# Patient Record
Sex: FEMALE | Race: WHITE | Employment: FULL TIME | URBAN - METROPOLITAN AREA
[De-identification: names, ages, dates, MRNs, and addresses within clinical notes are randomized per-mention and may not be internally consistent; named-entity substitution may affect disease eponyms.]

---

## 2019-02-01 PROBLEM — R01.1 MURMUR, CARDIAC: Chronic | Status: ACTIVE | Noted: 2019-02-01

## 2019-02-01 PROBLEM — R06.09 DYSPNEA ON EXERTION: Chronic | Status: ACTIVE | Noted: 2019-02-01

## 2019-09-24 RX ORDER — ASPIRIN 81 MG/1
81 TABLET ORAL DAILY
COMMUNITY

## 2019-09-24 NOTE — PROGRESS NOTES
Patient pre-assessment complete for [facility] poss with Dr Briseida Kay scheduled for 19 at 9:30am, arrival time 7:30am. Patient verified using . Patient instructed to bring all home medications in labeled bottles on the day of procedure. NPO status reinforced. Patient informed to take a full dose aspirin 325mg  or 81 mg x 4 on the day of procedure. . Instructed they can take all other medications excluding vitamins & supplements. Patient verbalizes understanding of all instructions & denies any questions at this time.

## 2019-09-25 ENCOUNTER — HOSPITAL ENCOUNTER (OUTPATIENT)
Dept: CARDIAC CATH/INVASIVE PROCEDURES | Age: 69
Setting detail: OBSERVATION
Discharge: HOME | End: 2019-09-26
Attending: INTERNAL MEDICINE | Admitting: INTERNAL MEDICINE
Payer: MEDICARE

## 2019-09-25 DIAGNOSIS — I25.10 CAD S/P PERCUTANEOUS CORONARY ANGIOPLASTY: Primary | ICD-10-CM

## 2019-09-25 DIAGNOSIS — Z98.61 CAD S/P PERCUTANEOUS CORONARY ANGIOPLASTY: Primary | ICD-10-CM

## 2019-09-25 PROBLEM — R07.2 PRECORDIAL PAIN: Chronic | Status: ACTIVE | Noted: 2019-09-25

## 2019-09-25 PROBLEM — I25.118 CORONARY ARTERY DISEASE OF NATIVE ARTERY OF NATIVE HEART WITH STABLE ANGINA PECTORIS (HCC): Chronic | Status: ACTIVE | Noted: 2019-09-25

## 2019-09-25 PROBLEM — E78.5 DYSLIPIDEMIA: Chronic | Status: ACTIVE | Noted: 2019-09-25

## 2019-09-25 LAB
ANION GAP SERPL CALC-SCNC: 3 MMOL/L (ref 7–16)
ATRIAL RATE: 53 BPM
ATRIAL RATE: 56 BPM
BUN SERPL-MCNC: 10 MG/DL (ref 8–23)
CALCIUM SERPL-MCNC: 9.2 MG/DL (ref 8.3–10.4)
CALCULATED P AXIS, ECG09: 35 DEGREES
CALCULATED P AXIS, ECG09: 66 DEGREES
CALCULATED R AXIS, ECG10: 30 DEGREES
CALCULATED R AXIS, ECG10: 47 DEGREES
CALCULATED T AXIS, ECG11: 30 DEGREES
CALCULATED T AXIS, ECG11: 48 DEGREES
CHLORIDE SERPL-SCNC: 108 MMOL/L (ref 98–107)
CO2 SERPL-SCNC: 29 MMOL/L (ref 21–32)
CREAT SERPL-MCNC: 0.72 MG/DL (ref 0.6–1)
DIAGNOSIS, 93000: NORMAL
DIAGNOSIS, 93000: NORMAL
ERYTHROCYTE [DISTWIDTH] IN BLOOD BY AUTOMATED COUNT: 13.4 % (ref 11.9–14.6)
GLUCOSE SERPL-MCNC: 93 MG/DL (ref 65–100)
HCT VFR BLD AUTO: 38.5 % (ref 35.8–46.3)
HGB BLD-MCNC: 12.6 G/DL (ref 11.7–15.4)
INR PPP: 0.9
MAGNESIUM SERPL-MCNC: 2.4 MG/DL (ref 1.8–2.4)
MCH RBC QN AUTO: 32 PG (ref 26.1–32.9)
MCHC RBC AUTO-ENTMCNC: 32.7 G/DL (ref 31.4–35)
MCV RBC AUTO: 97.7 FL (ref 79.6–97.8)
NRBC # BLD: 0 K/UL (ref 0–0.2)
P-R INTERVAL, ECG05: 164 MS
P-R INTERVAL, ECG05: 200 MS
PLATELET # BLD AUTO: 209 K/UL (ref 150–450)
PMV BLD AUTO: 10.7 FL (ref 9.4–12.3)
POTASSIUM SERPL-SCNC: 4.8 MMOL/L (ref 3.5–5.1)
PROTHROMBIN TIME: 12.2 SEC (ref 11.7–14.5)
Q-T INTERVAL, ECG07: 438 MS
Q-T INTERVAL, ECG07: 454 MS
QRS DURATION, ECG06: 88 MS
QRS DURATION, ECG06: 88 MS
QTC CALCULATION (BEZET), ECG08: 410 MS
QTC CALCULATION (BEZET), ECG08: 438 MS
RBC # BLD AUTO: 3.94 M/UL (ref 4.05–5.2)
SODIUM SERPL-SCNC: 140 MMOL/L (ref 136–145)
VENTRICULAR RATE, ECG03: 53 BPM
VENTRICULAR RATE, ECG03: 56 BPM
WBC # BLD AUTO: 5.5 K/UL (ref 4.3–11.1)

## 2019-09-25 PROCEDURE — 92928 PRQ TCAT PLMT NTRAC ST 1 LES: CPT

## 2019-09-25 PROCEDURE — 74011250637 HC RX REV CODE- 250/637: Performed by: NURSE PRACTITIONER

## 2019-09-25 PROCEDURE — C1874 STENT, COATED/COV W/DEL SYS: HCPCS

## 2019-09-25 PROCEDURE — 77030029997 HC DEV COM RDL R BND TELE -B

## 2019-09-25 PROCEDURE — C1769 GUIDE WIRE: HCPCS

## 2019-09-25 PROCEDURE — 74011636320 HC RX REV CODE- 636/320: Performed by: INTERNAL MEDICINE

## 2019-09-25 PROCEDURE — 74011250636 HC RX REV CODE- 250/636

## 2019-09-25 PROCEDURE — 77030004534 HC CATH ANGI DX INFN CARD -A

## 2019-09-25 PROCEDURE — C1725 CATH, TRANSLUMIN NON-LASER: HCPCS

## 2019-09-25 PROCEDURE — 93458 L HRT ARTERY/VENTRICLE ANGIO: CPT

## 2019-09-25 PROCEDURE — 77030012468 HC VLV BLEEDBK CNTRL ABBT -B

## 2019-09-25 PROCEDURE — 83735 ASSAY OF MAGNESIUM: CPT

## 2019-09-25 PROCEDURE — 99218 HC RM OBSERVATION: CPT

## 2019-09-25 PROCEDURE — 80048 BASIC METABOLIC PNL TOTAL CA: CPT

## 2019-09-25 PROCEDURE — 74011250637 HC RX REV CODE- 250/637: Performed by: INTERNAL MEDICINE

## 2019-09-25 PROCEDURE — 99153 MOD SED SAME PHYS/QHP EA: CPT

## 2019-09-25 PROCEDURE — 85610 PROTHROMBIN TIME: CPT

## 2019-09-25 PROCEDURE — C1894 INTRO/SHEATH, NON-LASER: HCPCS

## 2019-09-25 PROCEDURE — 77030015766

## 2019-09-25 PROCEDURE — 74011000250 HC RX REV CODE- 250: Performed by: INTERNAL MEDICINE

## 2019-09-25 PROCEDURE — 85027 COMPLETE CBC AUTOMATED: CPT

## 2019-09-25 PROCEDURE — 74011000258 HC RX REV CODE- 258: Performed by: INTERNAL MEDICINE

## 2019-09-25 PROCEDURE — 93005 ELECTROCARDIOGRAM TRACING: CPT | Performed by: INTERNAL MEDICINE

## 2019-09-25 PROCEDURE — C1887 CATHETER, GUIDING: HCPCS

## 2019-09-25 PROCEDURE — 74011250636 HC RX REV CODE- 250/636: Performed by: INTERNAL MEDICINE

## 2019-09-25 PROCEDURE — 99152 MOD SED SAME PHYS/QHP 5/>YRS: CPT

## 2019-09-25 RX ORDER — DIPHENHYDRAMINE HCL 25 MG
25 CAPSULE ORAL
Status: DISCONTINUED | OUTPATIENT
Start: 2019-09-25 | End: 2019-09-26 | Stop reason: HOSPADM

## 2019-09-25 RX ORDER — ONDANSETRON 2 MG/ML
4 INJECTION INTRAMUSCULAR; INTRAVENOUS
Status: DISCONTINUED | OUTPATIENT
Start: 2019-09-25 | End: 2019-09-26 | Stop reason: HOSPADM

## 2019-09-25 RX ORDER — ATORVASTATIN CALCIUM 40 MG/1
80 TABLET, FILM COATED ORAL
Status: DISCONTINUED | OUTPATIENT
Start: 2019-09-25 | End: 2019-09-26 | Stop reason: HOSPADM

## 2019-09-25 RX ORDER — NITROGLYCERIN 0.4 MG/1
0.4 TABLET SUBLINGUAL
Status: DISCONTINUED | OUTPATIENT
Start: 2019-09-25 | End: 2019-09-26 | Stop reason: HOSPADM

## 2019-09-25 RX ORDER — LISINOPRIL 5 MG/1
2.5 TABLET ORAL DAILY
Status: DISCONTINUED | OUTPATIENT
Start: 2019-09-26 | End: 2019-09-26 | Stop reason: HOSPADM

## 2019-09-25 RX ORDER — ASPIRIN 81 MG/1
81 TABLET ORAL DAILY
Status: DISCONTINUED | OUTPATIENT
Start: 2019-09-26 | End: 2019-09-25 | Stop reason: SDUPTHER

## 2019-09-25 RX ORDER — DIAZEPAM 5 MG/1
5 TABLET ORAL AS NEEDED
Status: DISCONTINUED | OUTPATIENT
Start: 2019-09-25 | End: 2019-09-25 | Stop reason: HOSPADM

## 2019-09-25 RX ORDER — LORAZEPAM 1 MG/1
1 TABLET ORAL
Status: DISCONTINUED | OUTPATIENT
Start: 2019-09-25 | End: 2019-09-26 | Stop reason: HOSPADM

## 2019-09-25 RX ORDER — HYDROCODONE BITARTRATE AND ACETAMINOPHEN 5; 325 MG/1; MG/1
1 TABLET ORAL
Status: DISCONTINUED | OUTPATIENT
Start: 2019-09-25 | End: 2019-09-26 | Stop reason: HOSPADM

## 2019-09-25 RX ORDER — SODIUM CHLORIDE 9 MG/ML
75 INJECTION, SOLUTION INTRAVENOUS CONTINUOUS
Status: DISCONTINUED | OUTPATIENT
Start: 2019-09-25 | End: 2019-09-26 | Stop reason: HOSPADM

## 2019-09-25 RX ORDER — SODIUM CHLORIDE 0.9 % (FLUSH) 0.9 %
5-40 SYRINGE (ML) INJECTION AS NEEDED
Status: DISCONTINUED | OUTPATIENT
Start: 2019-09-25 | End: 2019-09-26 | Stop reason: HOSPADM

## 2019-09-25 RX ORDER — HEPARIN SODIUM 200 [USP'U]/100ML
3 INJECTION, SOLUTION INTRAVENOUS CONTINUOUS
Status: DISCONTINUED | OUTPATIENT
Start: 2019-09-25 | End: 2019-09-25 | Stop reason: HOSPADM

## 2019-09-25 RX ORDER — ACETAMINOPHEN 325 MG/1
650 TABLET ORAL
Status: DISCONTINUED | OUTPATIENT
Start: 2019-09-25 | End: 2019-09-26 | Stop reason: HOSPADM

## 2019-09-25 RX ORDER — SODIUM CHLORIDE 0.9 % (FLUSH) 0.9 %
5 SYRINGE (ML) INJECTION AS NEEDED
Status: DISCONTINUED | OUTPATIENT
Start: 2019-09-25 | End: 2019-09-26 | Stop reason: HOSPADM

## 2019-09-25 RX ORDER — FENTANYL CITRATE 50 UG/ML
25-50 INJECTION, SOLUTION INTRAMUSCULAR; INTRAVENOUS
Status: DISCONTINUED | OUTPATIENT
Start: 2019-09-25 | End: 2019-09-25 | Stop reason: HOSPADM

## 2019-09-25 RX ORDER — GUAIFENESIN 100 MG/5ML
324 LIQUID (ML) ORAL ONCE
Status: ACTIVE | OUTPATIENT
Start: 2019-09-25 | End: 2019-09-25

## 2019-09-25 RX ORDER — GUAIFENESIN 100 MG/5ML
81 LIQUID (ML) ORAL DAILY
Status: DISCONTINUED | OUTPATIENT
Start: 2019-09-26 | End: 2019-09-26 | Stop reason: HOSPADM

## 2019-09-25 RX ORDER — LIDOCAINE HYDROCHLORIDE 10 MG/ML
6 INJECTION INFILTRATION; PERINEURAL ONCE
Status: COMPLETED | OUTPATIENT
Start: 2019-09-25 | End: 2019-09-25

## 2019-09-25 RX ORDER — SODIUM CHLORIDE 0.9 % (FLUSH) 0.9 %
5-40 SYRINGE (ML) INJECTION EVERY 8 HOURS
Status: DISCONTINUED | OUTPATIENT
Start: 2019-09-25 | End: 2019-09-26 | Stop reason: HOSPADM

## 2019-09-25 RX ORDER — MIDAZOLAM HYDROCHLORIDE 1 MG/ML
.5-2 INJECTION, SOLUTION INTRAMUSCULAR; INTRAVENOUS
Status: DISCONTINUED | OUTPATIENT
Start: 2019-09-25 | End: 2019-09-25 | Stop reason: HOSPADM

## 2019-09-25 RX ADMIN — MIDAZOLAM HYDROCHLORIDE 2 MG: 1 INJECTION, SOLUTION INTRAMUSCULAR; INTRAVENOUS at 12:10

## 2019-09-25 RX ADMIN — HEPARIN SODIUM 3 ML/HR: 200 INJECTION, SOLUTION INTRAVENOUS at 12:10

## 2019-09-25 RX ADMIN — FENTANYL CITRATE 50 MCG: 50 INJECTION, SOLUTION INTRAMUSCULAR; INTRAVENOUS at 12:10

## 2019-09-25 RX ADMIN — SODIUM CHLORIDE 75 ML/HR: 900 INJECTION, SOLUTION INTRAVENOUS at 08:19

## 2019-09-25 RX ADMIN — HEPARIN SODIUM 2 ML: 10000 INJECTION INTRAVENOUS; SUBCUTANEOUS at 12:16

## 2019-09-25 RX ADMIN — FENTANYL CITRATE 50 MCG: 50 INJECTION, SOLUTION INTRAMUSCULAR; INTRAVENOUS at 12:32

## 2019-09-25 RX ADMIN — IOPAMIDOL 200 ML: 755 INJECTION, SOLUTION INTRAVENOUS at 12:57

## 2019-09-25 RX ADMIN — TICAGRELOR 180 MG: 90 TABLET ORAL at 12:58

## 2019-09-25 RX ADMIN — LIDOCAINE HYDROCHLORIDE 3 ML: 10 INJECTION, SOLUTION INFILTRATION; PERINEURAL at 12:15

## 2019-09-25 RX ADMIN — DIPHENHYDRAMINE HYDROCHLORIDE 25 MG: 25 CAPSULE ORAL at 22:43

## 2019-09-25 RX ADMIN — HYDROCODONE BITARTRATE AND ACETAMINOPHEN 1 TABLET: 5; 325 TABLET ORAL at 13:42

## 2019-09-25 RX ADMIN — Medication 5 ML: at 22:20

## 2019-09-25 RX ADMIN — BIVALIRUDIN 1.75 MG/KG/HR: 250 INJECTION, POWDER, LYOPHILIZED, FOR SOLUTION INTRAVENOUS at 12:24

## 2019-09-25 RX ADMIN — HYDROCODONE BITARTRATE AND ACETAMINOPHEN 1 TABLET: 5; 325 TABLET ORAL at 17:35

## 2019-09-25 RX ADMIN — MIDAZOLAM HYDROCHLORIDE 2 MG: 1 INJECTION, SOLUTION INTRAMUSCULAR; INTRAVENOUS at 12:31

## 2019-09-25 RX ADMIN — HYDROCODONE BITARTRATE AND ACETAMINOPHEN 1 TABLET: 5; 325 TABLET ORAL at 22:19

## 2019-09-25 RX ADMIN — DIAZEPAM 5 MG: 5 TABLET ORAL at 08:17

## 2019-09-25 RX ADMIN — MIDAZOLAM HYDROCHLORIDE 2 MG: 1 INJECTION, SOLUTION INTRAMUSCULAR; INTRAVENOUS at 12:15

## 2019-09-25 RX ADMIN — MIDAZOLAM HYDROCHLORIDE 2 MG: 1 INJECTION, SOLUTION INTRAMUSCULAR; INTRAVENOUS at 12:41

## 2019-09-25 NOTE — PROGRESS NOTES
Radial compression band removed at 1545 after slowly reducing air from 12 cc to zero as per hospital protocol. No bleeding or hematoma noted. 2 x 2 gauze with tegaderm placed over puncture site. The affected extremity is warm and dry to the touch. Frequent vital signs printed and placed on bedside chart. Patient instructed to call if any bleeding noted on gauze. Patient verbalized understanding the nursing instructions.

## 2019-09-25 NOTE — PROGRESS NOTES
Shift change report received from Fredy Medina RN (off going nurse). Plan of care reviewed with patient at bedside. Opportunity to ask questions provided. Denies needs at this time. Bed low and locked with call light within reach. Patient instructed to call for assistance. Patient verbalized understanding. Right radial site visualized.

## 2019-09-25 NOTE — PROGRESS NOTES
Initial visit to assess pt's spiritual needs. Feeling today? Good; pt stated she really wanted to go home, but was told she needed to stay overnight. Receiving good care?  yes    Needs from Spiritual Care:  Pt stated \"I've been taking care of things on my own\"  Pt then talked about her 's recent death & 3 adult children who depended on her more than she would like. She was  39 years, and was her 's caregiver. Pt has just begun to realize how the stress affected her physical & emotional health. Ministry of presence & prayer to demonstrate caring & concern, convey emotional & spiritual support.     nahomi Santiago, MDiv,ThM,PhD

## 2019-09-25 NOTE — PROGRESS NOTES
Patient received to [address] [place] [address] room # 10  Ambulatory from [facility]. Patient scheduled for [facility] today with Dr Luz Elena Flores. Procedure reviewed & questions answered, voiced good understanding consent obtained & placed on chart. All medications and medical history reviewed. Will prep patient per orders. Patient & family updated on plan of care. The patient has a fraility score of 3-MANAGING WELL, based on reports no recent falls.

## 2019-09-25 NOTE — PROCEDURES
[facility]  CARDIAC CATH    Name:  Pablo Gomes  MR#:  [id number]  :  1950  ACCOUNT #:  [de-identified]  DATE OF SERVICE:  2019    PRIMARY CARDIOLOGIST:  Haseeb Rebolledo MD    PRIMARY CARE PHYSICIAN:  Terrie Baumgarten. MD Maria Esther    BRIEF HISTORY:  The patient is a 70-year-old female with history of dyslipidemia, extensive medication intolerance, presents with multiple episodes of chest discomfort. She had stress testing with inferolateral ST depression, now referred for cardiac catheterization. PREOPERATIVE DIAGNOSES:  Abnormal stress testing and chest pain worrisome for typical angina. POSTOPERATIVE DIAGNOSIS:  Coronary artery disease. PROCEDURES PERFORMED:  Bilateral selective coronary angiography and left ventriculography, PCI and stenting of the mid LAD, PCI and stenting of the distal right coronary artery. SURGEON:  Mateo Rebolledo MD    ASSISTANT:  None. COMPLICATIONS:  None. ANESTHESIA:  Conscious sedation. Start time 91 21 06, end time 1300. MEDICATIONS:  8 mg of Versed, 100 mcg of fentanyl. MONITORING RN:  Raven Polanco. IMPLANTS:  1.  3.0 x 22 Randolph drug-eluting stent to mid LAD. 2.  4.0 x 22 Randolph drug-eluting stent to distal right coronary artery. SPECIMENS:  None. ESTIMATED BLOOD LOSS:  Less than 5 mL. PROCEDURE:  After informed consent, she was prepped and draped in the usual sterile fashion. Right wrist was infiltrated with lidocaine. Right radial artery was accessed. A 6-Swiss sheath was advanced. A 5-Swiss Tiger catheter was utilized for left and right coronary injections. A 5-Swiss multipurpose catheter was utilized for anomalous left circumflex injection as well as left ventriculography. Isovue contrast utilized. FINDINGS:  1. Left ventricle:  Normal left ventricular size. Normal left ventricular systolic function. Ejection fraction is 65%. There is no significant mitral regurgitation. No aortic valve gradient. Left ventricular end-diastolic pressure is measured at 18 mmHg. 2.  Left main:  Left main is long and large, somewhat ectatic in nature. This is consistent with anomalous left circumflex. There was a very large mid vessel diagonal.  Just beyond the diagonal, there was 80% tubular stenosis into the LAD which then bifurcates into a large septal branch and ongoing LAD. The LAD then courses the apex disease-free. The large diagonal is disease-free. 3.  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 This appears angiographically normal.  4.  Right coronary: It is a large vessel. It is ectatic throughout the prox. There is a 40-50% mid stenosis. There is a 95% distal stenosis. There is a moderate-sized posterior descending and posterolateral branches which appear normal.    PERCUTANEOUS CORONARY INTERVENTION:  Lesion #1:  Mid LAD. Pre-stenosis 80%, post-stenosis 0%. DETAILS:  The patient was anticoagulated with Angiomax. A 6-Macedonian XB3.0 guide was utilized. Prowater wire was advanced into the LAD and a Runthrough wire into the diagonal.  The mid LAD was predilated with a 2.5 x 15 AngioSculpt atherectomy balloon, subsequently stented with a 2.75 x 22 Jn drug-eluting stent jailing the large mid vessel diagonal.  The prox and mid portions of the stent were postdilated to high pressure with a 3 mm noncompliant balloon. This yielded an excellent angiographic result. There was no compromise of the jailed diagonal.  The wire was removed and orthogonal views were obtained. Lesion #2:  Distal right coronary artery. Pre-stenosis 95%, post-stenosis 0%. DETAILS:  The patient was anticoagulated with Angiomax. A 6-Macedonian JR5 guide was utilized. Runthrough wire was advanced distally. The lesion was directly stented with a 3.5 x 22 Hubertus drug-eluting stent, postdilated with a 4.0 x 12 NC Fidencio SOHAN balloon.   This yielded an excellent angiographic result. The wire was removed and orthogonal views were obtained. 180 mg of Brilinta administered in the lab. Successful hemostasis with pneumatic radial band. CONCLUSIONS:  1. Normal left ventricular systolic function. 2.  Severe mid LAD stenosis status post stenting with Cleveland drug-eluting stent. 3.  Severe distal right coronary stenosis status post stenting with Cleveland drug-eluting stent. 4.  Moderate mid right coronary artery stenosis, will be managed medically. Thank you for allowing us to participate in the care of this patient. Any questions or concerns, please feel free to contact me.       Tulio Chacon MD      [id number]  D:  09/25/2019 13:08  T:  09/25/2019 14:23  JOB #:  [id number]  CC:  MD Ariel Danielson MD

## 2019-09-25 NOTE — PROGRESS NOTES
TRANSFER - IN REPORT:    Verbal report received from Mandy Duncan RN on Madie Peterson 1723 being received from cath lab for routine post - op      Report consisted of patients Situation, Background, Assessment and Recommendations(SBAR). Information from the following report(s) SBAR, Kardex, Intake/Output, MAR and Recent Results was reviewed with the receiving nurse. Opportunity for questions and clarification was provided. Assessment completed upon patients arrival to unit and care assumed. Patient placed on tele and eagle monitor, blood pressures cycling every 15 minutes. Patient educated on limited movement of right arm. BEd low and locked, call light within reach. Skin: back and sacrum intact. Heels intact. Right radial site free of hematoma and bleeding, old dry blood under band.

## 2019-09-25 NOTE — H&P
[facility] CARDIOLOGY History &Physical                Primary Cardiologist: Kiana Stafford MD    Primary Care Physician:  Umesh Diamond MD    Subjective:     Patient is a 71 y.o. female presents with ongoing on/off CP. Echo is normal.  Treadmill stress testing with inferolateral ST depression. No referred for LHC.      Past Medical History:   Diagnosis Date    Arthritis     Endometriosis 1973    Fibrocystic breast changes     Sleep apnea         Current Facility-Administered Medications:     0.9% sodium chloride infusion, 75 mL/hr, IntraVENous, CONTINUOUS, Mateo Rodrigues MD, Last Rate: 75 mL/hr at 19, 75 mL/hr at 19    aspirin chewable tablet 324 mg, 324 mg, Oral, ONCE, Brenton Rodrigues MD, Stopped at 19    saline peripheral flush soln 5 mL, 5 mL, InterCATHeter, PRN, Reese Jose MD    diazePAM (VALIUM) tablet 5 mg, 5 mg, Oral, PRN, Reese Jose MD, 5 mg at 19    fentaNYL citrate (PF) injection 25-50 mcg, 25-50 mcg, IntraVENous, Multiple, Mateo Rodrigues MD    heparin (PF) 2 units/ml in NS infusion, 3 mL/hr, IntraVENous, CONTINUOUS, Mateo Rodrigues MD    NITROGLYCERIN 0.2MG/ML SYRINGE 200 mcg, verapamil 2 mg, heparin (porcine) 2,000 Units injection, 2 mL, IntraarTERial, ONCE, Mateo Rodrigues MD    iopamidol (ISOVUE-370) 76 % injection 50 mL, 50 mL, IntraVENous, ONCE, Mateo Rodrigues MD    lidocaine (XYLOCAINE) 10 mg/mL (1 %) injection 6 mL, 6 mL, IntraDERMal, ONCE, Brenton Rodrigues MD    midazolam (VERSED) injection 0.5-2 mg, 0.5-2 mg, IntraVENous, Multiple, Mateo Rodrigues MD  No Known Allergies  Social History     Tobacco Use    Smoking status: Former Smoker     Last attempt to quit: 2019     Years since quittin.0    Smokeless tobacco: Never Used   Substance Use Topics    Alcohol use: Yes     Frequency: Never     Comment: occasional      Family History   Problem Relation Age of Onset    Ovarian Cancer Mother     Stomach Cancer Mother     Cancer Father         liver    Heart Failure Maternal Grandmother         Review of Systems    Review of Systems   Constitution: Negative for chills and fever. HENT: Negative for hearing loss. Eyes: Negative for visual disturbance. Cardiovascular: Positive for chest pain and dyspnea on exertion. Negative for palpitations. Respiratory: Positive for shortness of breath. Negative for cough and wheezing. Skin: Negative for rash. Musculoskeletal: Negative for back pain, muscle cramps and muscle weakness. Gastrointestinal: Negative for constipation, diarrhea and nausea. Genitourinary: Negative for dysuria. Neurological: Negative for dizziness and headaches. Psychiatric/Behavioral: Negative for depression. Objective:       Visit Vitals  /60 (BP 1 Location: Right arm, BP Patient Position: At rest)   Pulse 60   Temp 98.1 °F (36.7 °C)   Resp 16   Ht 5' 7\" (1.702 m)   Wt 83.9 kg (185 lb)   SpO2 95%   Breastfeeding? No   BMI 28.98 kg/m²       No intake/output data recorded. No intake/output data recorded. Physical Exam   Constitutional: She is oriented to person, place, and time. She appears well-developed and well-nourished. HENT:   Head: Normocephalic and atraumatic. Eyes: Pupils are equal, round, and reactive to light. Conjunctivae are normal.   Neck: Neck supple. No thyromegaly present. Cardiovascular: Normal rate, regular rhythm and normal heart sounds. Pulmonary/Chest: Breath sounds normal.   Abdominal: Soft. Bowel sounds are normal.   Musculoskeletal: Normal range of motion. Neurological: She is alert and oriented to person, place, and time. Skin: Skin is dry.        ECG: normal EKG, normal sinus rhythm, unchanged from previous tracings     Data Review:     Recent Results (from the past 24 hour(s))   CBC W/O DIFF    Collection Time: 09/25/19  8:09 AM   Result Value Ref Range    WBC 5.5 4.3 - 11.1 K/uL    RBC 3.94 (L) 4.05 - 5.2 M/uL    HGB 12.6 11.7 - 15.4 g/dL    HCT 38.5 35.8 - 46.3 %    MCV 97.7 79.6 - 97.8 FL    MCH 32.0 26.1 - 32.9 PG    MCHC 32.7 31.4 - 35.0 g/dL    RDW 13.4 11.9 - 14.6 %    PLATELET 330 399 - 289 K/uL    MPV 10.7 9.4 - 12.3 FL    ABSOLUTE NRBC 0.00 0.0 - 0.2 K/uL   METABOLIC PANEL, BASIC    Collection Time: 09/25/19  8:09 AM   Result Value Ref Range    Sodium 140 136 - 145 mmol/L    Potassium 4.8 3.5 - 5.1 mmol/L    Chloride 108 (H) 98 - 107 mmol/L    CO2 29 21 - 32 mmol/L    Anion gap 3 (L) 7 - 16 mmol/L    Glucose 93 65 - 100 mg/dL    BUN 10 8 - 23 MG/DL    Creatinine 0.72 0.6 - 1.0 MG/DL    GFR est AA >60 >60 ml/min/1.73m2    GFR est non-AA >60 >60 ml/min/1.73m2    Calcium 9.2 8.3 - 10.4 MG/DL   PROTHROMBIN TIME + INR    Collection Time: 09/25/19  8:09 AM   Result Value Ref Range    Prothrombin time 12.2 11.7 - 14.5 sec    INR 0.9     MAGNESIUM    Collection Time: 09/25/19  8:09 AM   Result Value Ref Range    Magnesium 2.4 1.8 - 2.4 mg/dL   EKG, 12 LEAD, INITIAL    Collection Time: 09/25/19  8:25 AM   Result Value Ref Range    Ventricular Rate 53 BPM    Atrial Rate 53 BPM    P-R Interval 164 ms    QRS Duration 88 ms    Q-T Interval 438 ms    QTC Calculation (Bezet) 410 ms    Calculated P Axis 35 degrees    Calculated R Axis 30 degrees    Calculated T Axis 30 degrees    Diagnosis       Sinus bradycardia  Otherwise normal ECG  No previous ECGs available  Confirmed by Herminio Sahu MD (), KINA GUAJARDO ([id number]) on 9/25/2019 9:10:45 AM             Assessment/Plan:   Principal Problem:    Precordial pain (9/25/2019) - Abnormal ST depression with treadmill ECG. Plan [facility].       Active Problems:    Dyspnea on exertion (2/1/2019)  - Normal echo       Dyslipidemia (9/25/2019) - On statin therapy                  Crista Owusu MD

## 2019-09-25 NOTE — PROGRESS NOTES
TRANSFER - OUT REPORT:    Verbal report given to Cate(name) on Merritt Alvarez  being transferred to cpru(unit) for routine progression of care       Report consisted of patients Situation, Background, Assessment and   Recommendations(SBAR). Information from the following report(s) SBAR was reviewed with the receiving nurse. Opportunity for questions and clarification was provided. Procedure: [facility]   Finding Summary: stent x 1 LAD, stent x 1 RCA(cath/pci/pacer settings)  [facility]    Closure Device: Rband 12 ml(yes/no/description)  Post Site Assessment: no bleeding no hematoma       Intra Procedure Meds:    Versed: 8mg  Fentanyl: 100mcg  Angiomax Stop Time: 1300  Antiplatelet: 754RT             Peripheral IV 09/25/19 Right Antecubital (Active)       Peripheral IV 09/25/19 Left Antecubital (Active)        Post-Procedure Site Assessment (1)  Wound Type: Catheter entry/exit  Location: Wrist  Orientation : Right  Hemostasis : TR Band(12ml)  Site Assessment: No bleeding, No hematoma                       has No Known Allergies. Past Medical History:   Diagnosis Date    Arthritis     Endometriosis 1973    Fibrocystic breast changes     Sleep apnea      Visit Vitals  /61   Pulse 62   Temp 98.1 °F (36.7 °C)   Resp 16   Ht 5' 7\" (1.702 m)   Wt 83.9 kg (185 lb)   SpO2 96%   Breastfeeding?  No   BMI 28.98 kg/m²

## 2019-09-25 NOTE — PROGRESS NOTES
TRANSFER - OUT REPORT:    Verbal report given to Shelby Mari RN(name) on Scar Gil  being transferred to telemetry(unit) for routine progression of care       Report consisted of patients Situation, Background, Assessment and   Recommendations(SBAR). Information from the following report(s) Kardex, Procedure Summary and Cardiac Rhythm sinus jaxson was reviewed with the receiving nurse. Lines:   Peripheral IV 09/25/19 Right Antecubital (Active)        Opportunity for questions and clarification was provided.       Patient transported with:   Monitor  Registered Nurse

## 2019-09-26 VITALS
SYSTOLIC BLOOD PRESSURE: 115 MMHG | DIASTOLIC BLOOD PRESSURE: 49 MMHG | RESPIRATION RATE: 18 BRPM | HEART RATE: 62 BPM | HEIGHT: 67 IN | OXYGEN SATURATION: 97 % | BODY MASS INDEX: 29.6 KG/M2 | TEMPERATURE: 98.5 F | WEIGHT: 188.6 LBS

## 2019-09-26 LAB
ANION GAP SERPL CALC-SCNC: 7 MMOL/L (ref 7–16)
ATRIAL RATE: 52 BPM
BASOPHILS # BLD: 0 K/UL (ref 0–0.2)
BASOPHILS NFR BLD: 1 % (ref 0–2)
BUN SERPL-MCNC: 9 MG/DL (ref 8–23)
CALCIUM SERPL-MCNC: 8.8 MG/DL (ref 8.3–10.4)
CALCULATED P AXIS, ECG09: 35 DEGREES
CALCULATED R AXIS, ECG10: 36 DEGREES
CALCULATED T AXIS, ECG11: 39 DEGREES
CHLORIDE SERPL-SCNC: 110 MMOL/L (ref 98–107)
CO2 SERPL-SCNC: 25 MMOL/L (ref 21–32)
CREAT SERPL-MCNC: 0.65 MG/DL (ref 0.6–1)
DIAGNOSIS, 93000: NORMAL
DIFFERENTIAL METHOD BLD: ABNORMAL
EOSINOPHIL # BLD: 0.2 K/UL (ref 0–0.8)
EOSINOPHIL NFR BLD: 3 % (ref 0.5–7.8)
ERYTHROCYTE [DISTWIDTH] IN BLOOD BY AUTOMATED COUNT: 13.3 % (ref 11.9–14.6)
GLUCOSE SERPL-MCNC: 96 MG/DL (ref 65–100)
HCT VFR BLD AUTO: 36.3 % (ref 35.8–46.3)
HGB BLD-MCNC: 11.9 G/DL (ref 11.7–15.4)
IMM GRANULOCYTES # BLD AUTO: 0 K/UL (ref 0–0.5)
IMM GRANULOCYTES NFR BLD AUTO: 0 % (ref 0–5)
LYMPHOCYTES # BLD: 2.6 K/UL (ref 0.5–4.6)
LYMPHOCYTES NFR BLD: 40 % (ref 13–44)
MCH RBC QN AUTO: 31.7 PG (ref 26.1–32.9)
MCHC RBC AUTO-ENTMCNC: 32.8 G/DL (ref 31.4–35)
MCV RBC AUTO: 96.8 FL (ref 79.6–97.8)
MONOCYTES # BLD: 0.5 K/UL (ref 0.1–1.3)
MONOCYTES NFR BLD: 8 % (ref 4–12)
NEUTS SEG # BLD: 3.1 K/UL (ref 1.7–8.2)
NEUTS SEG NFR BLD: 48 % (ref 43–78)
NRBC # BLD: 0 K/UL (ref 0–0.2)
P-R INTERVAL, ECG05: 166 MS
PLATELET # BLD AUTO: 183 K/UL (ref 150–450)
PMV BLD AUTO: 10.4 FL (ref 9.4–12.3)
POTASSIUM SERPL-SCNC: 4 MMOL/L (ref 3.5–5.1)
Q-T INTERVAL, ECG07: 434 MS
QRS DURATION, ECG06: 82 MS
QTC CALCULATION (BEZET), ECG08: 403 MS
RBC # BLD AUTO: 3.75 M/UL (ref 4.05–5.2)
SODIUM SERPL-SCNC: 142 MMOL/L (ref 136–145)
VENTRICULAR RATE, ECG03: 52 BPM
WBC # BLD AUTO: 6.5 K/UL (ref 4.3–11.1)

## 2019-09-26 PROCEDURE — 74011250637 HC RX REV CODE- 250/637: Performed by: INTERNAL MEDICINE

## 2019-09-26 PROCEDURE — 99218 HC RM OBSERVATION: CPT

## 2019-09-26 PROCEDURE — 80048 BASIC METABOLIC PNL TOTAL CA: CPT

## 2019-09-26 PROCEDURE — 36415 COLL VENOUS BLD VENIPUNCTURE: CPT

## 2019-09-26 PROCEDURE — 85025 COMPLETE CBC W/AUTO DIFF WBC: CPT

## 2019-09-26 PROCEDURE — 93005 ELECTROCARDIOGRAM TRACING: CPT | Performed by: INTERNAL MEDICINE

## 2019-09-26 PROCEDURE — 74011250637 HC RX REV CODE- 250/637: Performed by: NURSE PRACTITIONER

## 2019-09-26 RX ORDER — LISINOPRIL 2.5 MG/1
2.5 TABLET ORAL DAILY
Qty: 30 TAB | Refills: 11 | Status: SHIPPED | OUTPATIENT
Start: 2019-09-26 | End: 2019-10-16

## 2019-09-26 RX ORDER — NITROGLYCERIN 0.4 MG/1
0.4 TABLET SUBLINGUAL
Qty: 1 BOTTLE | Refills: 5 | Status: SHIPPED | OUTPATIENT
Start: 2019-09-26

## 2019-09-26 RX ORDER — ATORVASTATIN CALCIUM 80 MG/1
80 TABLET, FILM COATED ORAL
Qty: 30 TAB | Refills: 11 | Status: SHIPPED | OUTPATIENT
Start: 2019-09-26

## 2019-09-26 RX ADMIN — LISINOPRIL 2.5 MG: 5 TABLET ORAL at 08:21

## 2019-09-26 RX ADMIN — DIPHENHYDRAMINE HYDROCHLORIDE 25 MG: 25 CAPSULE ORAL at 05:03

## 2019-09-26 RX ADMIN — Medication 5 ML: at 05:06

## 2019-09-26 RX ADMIN — TICAGRELOR 90 MG: 90 TABLET ORAL at 02:05

## 2019-09-26 RX ADMIN — ASPIRIN 81 MG 81 MG: 81 TABLET ORAL at 08:22

## 2019-09-26 RX ADMIN — HYDROCODONE BITARTRATE AND ACETAMINOPHEN 1 TABLET: 5; 325 TABLET ORAL at 05:02

## 2019-09-26 NOTE — PROGRESS NOTES
Care Management Interventions  Palliative Care Criteria Met (RRAT>21 & CHF Dx)?: No(RRAT 8 Dx CAD )  Mode of Transport at Discharge: Other (see comment)(family)  Transition of Care Consult (CM Consult): Discharge Planning  Discharge Durable Medical Equipment: No(CPAP)  Physical Therapy Consult: No  Occupational Therapy Consult: No  Speech Therapy Consult: No  Discharge Location  Discharge Placement: Home  Attempted to see patient prior to d/c but she had already left. Patient had voiced no needs or concerns to nursing at d/c.

## 2019-09-26 NOTE — PROGRESS NOTES
Bedside and Verbal shift change report given to Tequila Livingston RN (oncoming nurse) by self Sabina keller). Report included the following information SBAR, Kardex, MAR and Recent Results. Right radial site visualized.

## 2019-09-26 NOTE — DISCHARGE INSTRUCTIONS
Patient Education        Percutaneous Coronary Intervention: What to Expect at Home  Your Recovery    Percutaneous coronary intervention (PCI) is the name for procedures that are used to open a narrowed or blocked coronary artery. The two most common PCI procedures are coronary angioplasty and coronary stent placement. Your groin or arm may have a bruise and feel sore for a day or two after a percutaneous coronary intervention (PCI). You can do light activities around the house, but nothing strenuous for several days. This care sheet gives you a general idea about how long it will take for you to recover. But each person recovers at a different pace. Follow the steps below to get better as quickly as possible. How can you care for yourself at home? Activity    · If the doctor gave you a sedative:  ? For 24 hours, don't do anything that requires attention to detail, such as going to work, making important decisions, or signing any legal documents. It takes time for the medicine's effects to completely wear off.  ? For your safety, do not drive or operate any machinery that could be dangerous. Wait until the medicine wears off and you can think clearly and react easily.     · Do not do strenuous exercise and do not lift, pull, or push anything heavy until your doctor says it is okay. This may be for a day or two. You can walk around the house and do light activity, such as cooking.     · If the catheter was placed in your groin, try not to walk up stairs for the first couple of days.     · If the catheter was placed in your arm near your wrist, do not bend your wrist deeply for the first couple of days. Be careful using your hand to get into and out of a chair or bed.     · Carry your stent identification card with you at all times.     · If your doctor recommends it, get more exercise. Walking is a good choice. Bit by bit, increase the amount you walk every day.  Try for at least 30 minutes on most days of the week.   Diet    · Drink plenty of fluids to help your body flush out the dye. If you have kidney, heart, or liver disease and have to limit fluids, talk with your doctor before you increase the amount of fluids you drink.     · Keep eating a heart-healthy diet that has lots of fruits, vegetables, and whole grains. If you have not been eating this way, talk to your doctor. You also may want to talk to a dietitian. This expert can help you to learn about healthy foods and plan meals. Medicines    · Your doctor will tell you if and when you can restart your medicines. He or she will also give you instructions about taking any new medicines.     · If you take blood thinners, such as warfarin (Coumadin), clopidogrel (Plavix), or aspirin, be sure to talk to your doctor. He or she will tell you if and when to start taking those medicines again. Make sure that you understand exactly what your doctor wants you to do.     · Your doctor will prescribe blood-thinning medicines. You will likely take aspirin plus another antiplatelet, such as clopidogrel (Plavix). It is very important that you take these medicines exactly as directed. These medicines help keep the coronary artery open and reduce your risk of a heart attack.     · Call your doctor if you think you are having a problem with your medicine.    Care of the catheter site    · For 1 or 2 days, keep a bandage over the spot where the catheter was inserted. The bandage probably will fall off in this time.     · Put ice or a cold pack on the area for 10 to 20 minutes at a time to help with soreness or swelling. Put a thin cloth between the ice and your skin.     · You may shower 24 to 48 hours after the procedure, if your doctor okays it. Pat the incision dry.     · Do not soak the catheter site until it is healed. Don't take a bath for 1 week, or until your doctor tells you it is okay.     · Watch for bleeding from the site.  A small amount of blood (up to the size of a quarter) on the bandage can be normal.     · If you are bleeding, lie down and press on the area for 15 minutes to try to make it stop. If the bleeding does not stop, call your doctor or seek immediate medical care. Follow-up care is a key part of your treatment and safety. Be sure to make and go to all appointments, and call your doctor if you are having problems. It's also a good idea to know your test results and keep a list of the medicines you take. When should you call for help? Call 911 anytime you think you may need emergency care. For example, call if:    · You passed out (lost consciousness).     · You have severe trouble breathing.     · You have sudden chest pain and shortness of breath, or you cough up blood.     · You have symptoms of a heart attack, such as:  ? Chest pain or pressure. ? Sweating. ? Shortness of breath. ? Nausea or vomiting. ? Pain that spreads from the chest to the neck, jaw, or one or both shoulders or arms. ? Dizziness or lightheadedness. ? A fast or uneven pulse. After calling 911, chew 1 adult-strength aspirin. Wait for an ambulance. Do not try to drive yourself.     · You have been diagnosed with angina, and you have angina symptoms that do not go away with rest or are not getting better within 5 minutes after you take one dose of nitroglycerin.    Call your doctor now or seek immediate medical care if:    · You are bleeding from the area where the catheter was put in your artery.     · You have a fast-growing, painful lump at the catheter site.     · You have signs of infection, such as:  ? Increased pain, swelling, warmth, or redness. ? Red streaks leading from the catheter site. ? Pus draining from the catheter site. ? A fever.     · Your leg, arm, or hand is painful, looks blue, or feels cold, numb, or tingly.    Watch closely for changes in your health, and be sure to contact your doctor if you have any problems. Where can you learn more?   Go to http://brittani-michael.info/. Enter Y858 in the search box to learn more about \"Percutaneous Coronary Intervention: What to Expect at Home. \"  Current as of: April 9, 2019  Content Version: 12.2  © 2065-0418 [employer]. Care instructions adapted under license by [employer] (which disclaims liability or warranty for this information). If you have questions about a medical condition or this instruction, always ask your healthcare professional. [address] any warranty or liability for your use of this information. Cardiac Catheterization/Angiography Discharge Instructions    *Check the puncture site frequently for swelling or bleeding. If you see any bleeding, lie down and apply pressure over the area with a clean town or washcloth. Notify your doctor for any redness, swelling, drainage or oozing from the puncture site. Notify your doctor for any fever or chills. *If the leg or arm with the puncture becomes cold, numb or painful, call [facility] Cardiology at  [phone]    *Activity should be limited for the next 48 hours. Climb stairs as little as possible and avoid any stooping, bending or strenuous activity for 48 hours. No heavy lifting (anything over 10 pounds) for three days. *Do not drive for 48 hours. *You may resume your usual diet. Drink more fluids than usual.    *Have a responsible person drive you home and stay with you for at least 24 hours after your heart catheterization/angiography. *You may remove the bandage from your Right  Arm in 24 hours. You may shower in 24 hours. No tub baths, hot tubs or swimming for one week. Do not place any lotions, creams, powders, ointments over the puncture site for one week. You may place a clean band-aid over the puncture site each day for 5 days. Change this daily.

## 2019-09-26 NOTE — PROGRESS NOTES
Cardiac Rehab: Spoke with patient regarding referral to cardiac rehab. Patient meets admission criteria based on PCI (09/25/19). Written information about Cardiac Rehab given and reviewed with patient. Discussed lifestyle modifications to promote cardiac wellness. Patient indicated that she can not participate in the cardiac rehab program because she working full time. She is aware that her referral is valid for six months if she decides she can participate. Her Cardiologist is Dr. Jovany Winslow.       Thank you,  Nathan Can, ESEN, RN  Cardiopulmonary Rehabilitation Nurse Liaison  [facility]

## 2019-09-26 NOTE — PROGRESS NOTES
Problem: Cath Lab Procedures: Post-Cath Day of Procedure (Initiate SCIP Measures for Post-Op Care)  Goal: *Procedure site is without bleeding and signs of infection six hours post sheath removal  Outcome: Progressing Towards Goal  Goal: *Hemodynamically stable  Outcome: Progressing Towards Goal  Goal: *Optimal pain control at patient's stated goal  Outcome: Progressing Towards Goal     Problem: Falls - Risk of  Goal: *Absence of Falls  Description  Document Cinthya Cousin Fall Risk and appropriate interventions in the flowsheet.   Outcome: Progressing Towards Goal  Note:   Fall Risk Interventions:            Medication Interventions: Teach patient to arise slowly, Patient to call before getting OOB

## 2019-09-26 NOTE — PROGRESS NOTES
Bedside and Verbal shift change report given to self (oncoming nurse) by Alexis Street (offgoing nurse). Report included the following information SBAR, Kardex, Intake/Output and MAR.

## 2019-09-26 NOTE — DISCHARGE SUMMARY
[address] [place] [address] 121 Cardiology Discharge Summary     Patient ID:  Marixa Joiner  [id number]  71 y.o.  1950    Admit date: 9/25/2019    Discharge date:  09/26/2019    Admitting Physician: Gil Marcum MD     Discharge Physician: Dr. Kenneth Loza    Admission Diagnoses: Dyspnea [R06.00]  CAD (coronary artery disease) [I25.10]    Discharge Diagnoses:    Diagnosis    Coronary artery disease of native artery of native heart with stable angina pectoris ([address]    Dyslipidemia    CAD (coronary artery disease)    Dyspnea on exertion    Murmur, cardiac       Cardiology Procedures this admission:  Left heart catheterization with PCI  Consults: None    Hospital Course: Patient was seen at the office of [address] [place] [address] Cardiology by Dr. Lior Levin for complaints of CP with treadmill stress test showing inferolateral ST depression and was subsequently scheduled for an AM Admission [facility] on 09/25/19. Patient underwent cardiac catheterization by Dr. Lior Levin. Patient was found to have 80% stenosis of the mLAD that was stented with a [place] MYRNA 2.75x22 with 0% residual stenosis. Patient was also found to have 95% stenosis of the dRCA that was stented with a [place] MYRNA 3.5x22 with 0% residual stenosis. Patient tolerated the procedure well and was taken to the telemetry floor for recovery. The following morning on 9/26/19, the patient was up feeling well without any complaints of chest pain or shortness of breath. Patient's right radial cath site was clean, dry and intact without hematoma or bruit. Patient's labs were WNL. Patient was seen and examined by Dr. Kenneth Loza and determined stable and ready for discharge. Patient was instructed on the importance of medication compliance including taking aspirin and Brilinta everyday without missing a dose. After receiving drug eluting stents, the patient will remain on dual anti-platelet therapy for 1 year.   For maximized medical therapy for CAD, patient will continue ACE-I, and statin as well. No BB due to bradycardia. The patient will follow up with [facility] Cardiology -- Dr. Meghan Park on 10/16/19 at [place] [address] in [facility]. Patient has been referred to cardiac rehab. DISPOSITION: The patient is being discharged home in stable condition on a low saturated fat, low cholesterol and low salt diet. The patient is instructed to advance activities as tolerated to the limit of fatigue or shortness of breath. The patient is instructed to avoid all heavy lifting, straining, stooping or squatting for 3-5 days. The patient is instructed to watch the cath site for bleeding/oozing; if seen, the patient is instructed to apply firm pressure with a clean cloth and call [facility] Cardiology at [phone]. The patient is instructed to watch for signs of infection which include: increasing area of redness, fever/hot to touch or purulent drainage at the catheterization site. The patient is instructed not to soak in a bathtub for 7-10 days, but is cleared to shower. The patient is instructed to call the office or return to the ER for immediate evaluation for any shortness of breath or chest pain not relieved by NTG. Discharge Exam:   Visit Vitals  /58   Pulse (!) 54   Temp 98 °F (36.7 °C)   Resp 18   Ht 5' 7\" (1.702 m)   Wt 188 lb 9.6 oz (85.5 kg)   SpO2 96%   Breastfeeding? No   BMI 29.54 kg/m²     Patient has been seen by Dr. Goodman Scot: see his progress note for exam details.     Recent Results (from the past 24 hour(s))   CBC W/O DIFF    Collection Time: 09/25/19  8:09 AM   Result Value Ref Range    WBC 5.5 4.3 - 11.1 K/uL    RBC 3.94 (L) 4.05 - 5.2 M/uL    HGB 12.6 11.7 - 15.4 g/dL    HCT 38.5 35.8 - 46.3 %    MCV 97.7 79.6 - 97.8 FL    MCH 32.0 26.1 - 32.9 PG    MCHC 32.7 31.4 - 35.0 g/dL    RDW 13.4 11.9 - 14.6 %    PLATELET 944 053 - 714 K/uL    MPV 10.7 9.4 - 12.3 FL    ABSOLUTE NRBC 0.00 0.0 - 0.2 K/uL   METABOLIC PANEL, BASIC    Collection Time: 09/25/19  8:09 AM   Result Value Ref Range    Sodium 140 136 - 145 mmol/L    Potassium 4.8 3.5 - 5.1 mmol/L    Chloride 108 (H) 98 - 107 mmol/L    CO2 29 21 - 32 mmol/L    Anion gap 3 (L) 7 - 16 mmol/L    Glucose 93 65 - 100 mg/dL    BUN 10 8 - 23 MG/DL    Creatinine 0.72 0.6 - 1.0 MG/DL    GFR est AA >60 >60 ml/min/1.73m2    GFR est non-AA >60 >60 ml/min/1.73m2    Calcium 9.2 8.3 - 10.4 MG/DL   PROTHROMBIN TIME + INR    Collection Time: 09/25/19  8:09 AM   Result Value Ref Range    Prothrombin time 12.2 11.7 - 14.5 sec    INR 0.9     MAGNESIUM    Collection Time: 09/25/19  8:09 AM   Result Value Ref Range    Magnesium 2.4 1.8 - 2.4 mg/dL   EKG, 12 LEAD, INITIAL    Collection Time: 09/25/19  8:25 AM   Result Value Ref Range    Ventricular Rate 53 BPM    Atrial Rate 53 BPM    P-R Interval 164 ms    QRS Duration 88 ms    Q-T Interval 438 ms    QTC Calculation (Bezet) 410 ms    Calculated P Axis 35 degrees    Calculated R Axis 30 degrees    Calculated T Axis 30 degrees    Diagnosis       Sinus bradycardia  Otherwise normal ECG  No previous ECGs available  Confirmed by Mar Toledo MD ()KINA ([id number]) on 9/25/2019 9:10:45 AM     EKG, 12 LEAD, INITIAL    Collection Time: 09/25/19  1:12 PM   Result Value Ref Range    Ventricular Rate 56 BPM    Atrial Rate 56 BPM    P-R Interval 200 ms    QRS Duration 88 ms    Q-T Interval 454 ms    QTC Calculation (Bezet) 438 ms    Calculated P Axis 66 degrees    Calculated R Axis 47 degrees    Calculated T Axis 48 degrees    Diagnosis       Sinus bradycardia  Low voltage QRS  When compared with ECG of 25-SEP-2019 08:25,  Minimal criteria for Anteroseptal infarct are now Present  Confirmed by Mar Toledo MD ()KINA ([id number]) on 9/25/2019 5:66:53 PM     METABOLIC PANEL, BASIC    Collection Time: 09/26/19  3:54 AM   Result Value Ref Range    Sodium 142 136 - 145 mmol/L    Potassium 4.0 3.5 - 5.1 mmol/L    Chloride 110 (H) 98 - 107 mmol/L    CO2 25 21 - 32 mmol/L    Anion gap 7 7 - 16 mmol/L    Glucose 96 65 - 100 mg/dL    BUN 9 8 - 23 MG/DL    Creatinine 0.65 0.6 - 1.0 MG/DL    GFR est AA >60 >60 ml/min/1.73m2    GFR est non-AA >60 >60 ml/min/1.73m2    Calcium 8.8 8.3 - 10.4 MG/DL   CBC WITH AUTOMATED DIFF    Collection Time: 09/26/19  3:54 AM   Result Value Ref Range    WBC 6.5 4.3 - 11.1 K/uL    RBC 3.75 (L) 4.05 - 5.2 M/uL    HGB 11.9 11.7 - 15.4 g/dL    HCT 36.3 35.8 - 46.3 %    MCV 96.8 79.6 - 97.8 FL    MCH 31.7 26.1 - 32.9 PG    MCHC 32.8 31.4 - 35.0 g/dL    RDW 13.3 11.9 - 14.6 %    PLATELET 860 862 - 867 K/uL    MPV 10.4 9.4 - 12.3 FL    ABSOLUTE NRBC 0.00 0.0 - 0.2 K/uL    DF AUTOMATED      NEUTROPHILS 48 43 - 78 %    LYMPHOCYTES 40 13 - 44 %    MONOCYTES 8 4.0 - 12.0 %    EOSINOPHILS 3 0.5 - 7.8 %    BASOPHILS 1 0.0 - 2.0 %    IMMATURE GRANULOCYTES 0 0.0 - 5.0 %    ABS. NEUTROPHILS 3.1 1.7 - 8.2 K/UL    ABS. LYMPHOCYTES 2.6 0.5 - 4.6 K/UL    ABS. MONOCYTES 0.5 0.1 - 1.3 K/UL    ABS. EOSINOPHILS 0.2 0.0 - 0.8 K/UL    ABS. BASOPHILS 0.0 0.0 - 0.2 K/UL    ABS. IMM. GRANS. 0.0 0.0 - 0.5 K/UL         Patient Instructions:     Current Discharge Medication List      START taking these medications    Details   lisinopril (PRINIVIL, ZESTRIL) 2.5 mg tablet Take 1 Tab by mouth daily. Qty: 30 Tab, Refills: 11      nitroglycerin (NITROSTAT) 0.4 mg SL tablet 1 Tab by SubLINGual route every five (5) minutes as needed for Chest Pain. Up to 3 doses. Qty: 1 Bottle, Refills: 5      ticagrelor (BRILINTA) 90 mg tablet Take 1 Tab by mouth every twelve (12) hours every twelve (12) hours. Qty: 60 Tab, Refills: 11         CONTINUE these medications which have CHANGED    Details   atorvastatin (LIPITOR) 80 mg tablet Take 1 Tab by mouth nightly. Qty: 30 Tab, Refills: 11         CONTINUE these medications which have NOT CHANGED    Details   aspirin delayed-release 81 mg tablet Take 81 mg by mouth daily. cholecalciferol (VITAMIN D3) 1,000 unit cap Take  by mouth daily.       cyanocobalamin (VITAMIN B-12) 1,000 mcg tablet Take 1,000 mcg by mouth daily. ascorbic acid, vitamin C, (VITAMIN C) 500 mg tablet Take  by mouth daily.

## 2019-09-26 NOTE — PROGRESS NOTES
Discharge instructions reviewed with pt. Prescriptions given for new medications and med info sheets provided for all new medications. Opportunity for questions provided. pt voiced understanding of all discharge instructions.    Iv and monitor removed by primary RN

## 2019-10-16 PROBLEM — I25.10 CAD (CORONARY ARTERY DISEASE): Status: RESOLVED | Noted: 2019-09-25 | Resolved: 2019-10-16

## 2019-10-16 PROBLEM — R06.09 DYSPNEA ON EXERTION: Chronic | Status: RESOLVED | Noted: 2019-02-01 | Resolved: 2019-10-16

## 2019-10-16 PROBLEM — R01.1 MURMUR, CARDIAC: Chronic | Status: RESOLVED | Noted: 2019-02-01 | Resolved: 2019-10-16

## 2019-10-16 PROBLEM — Z72.0 TOBACCO ABUSE: Chronic | Status: ACTIVE | Noted: 2019-10-16

## 2020-01-25 ENCOUNTER — HOSPITAL ENCOUNTER (EMERGENCY)
Age: 70
Discharge: HOME | End: 2020-01-25
Attending: EMERGENCY MEDICINE
Payer: MEDICARE

## 2020-01-25 ENCOUNTER — APPOINTMENT (OUTPATIENT)
Dept: GENERAL RADIOLOGY | Age: 70
End: 2020-01-25
Attending: EMERGENCY MEDICINE
Payer: MEDICARE

## 2020-01-25 VITALS
SYSTOLIC BLOOD PRESSURE: 124 MMHG | DIASTOLIC BLOOD PRESSURE: 82 MMHG | OXYGEN SATURATION: 100 % | HEIGHT: 67 IN | WEIGHT: 187 LBS | TEMPERATURE: 98.2 F | HEART RATE: 66 BPM | BODY MASS INDEX: 29.35 KG/M2 | RESPIRATION RATE: 16 BRPM

## 2020-01-25 DIAGNOSIS — R07.89 CHEST WALL PAIN: Primary | ICD-10-CM

## 2020-01-25 LAB
ALBUMIN SERPL-MCNC: 4 G/DL (ref 3.2–4.6)
ALBUMIN/GLOB SERPL: 1 {RATIO} (ref 1.2–3.5)
ALP SERPL-CCNC: 80 U/L (ref 50–136)
ALT SERPL-CCNC: 24 U/L (ref 12–65)
ANION GAP SERPL CALC-SCNC: 3 MMOL/L (ref 7–16)
AST SERPL-CCNC: 17 U/L (ref 15–37)
BASOPHILS # BLD: 0 K/UL (ref 0–0.2)
BASOPHILS NFR BLD: 1 % (ref 0–2)
BILIRUB SERPL-MCNC: 0.6 MG/DL (ref 0.2–1.1)
BUN SERPL-MCNC: 15 MG/DL (ref 8–23)
CALCIUM SERPL-MCNC: 9.2 MG/DL (ref 8.3–10.4)
CHLORIDE SERPL-SCNC: 106 MMOL/L (ref 98–107)
CO2 SERPL-SCNC: 30 MMOL/L (ref 21–32)
CREAT SERPL-MCNC: 0.82 MG/DL (ref 0.6–1)
DIFFERENTIAL METHOD BLD: ABNORMAL
EOSINOPHIL # BLD: 0.2 K/UL (ref 0–0.8)
EOSINOPHIL NFR BLD: 2 % (ref 0.5–7.8)
ERYTHROCYTE [DISTWIDTH] IN BLOOD BY AUTOMATED COUNT: 13.5 % (ref 11.9–14.6)
GLOBULIN SER CALC-MCNC: 3.9 G/DL (ref 2.3–3.5)
GLUCOSE SERPL-MCNC: 96 MG/DL (ref 65–100)
HCT VFR BLD AUTO: 36.3 % (ref 35.8–46.3)
HGB BLD-MCNC: 11.9 G/DL (ref 11.7–15.4)
IMM GRANULOCYTES # BLD AUTO: 0 K/UL (ref 0–0.5)
IMM GRANULOCYTES NFR BLD AUTO: 0 % (ref 0–5)
LYMPHOCYTES # BLD: 3 K/UL (ref 0.5–4.6)
LYMPHOCYTES NFR BLD: 36 % (ref 13–44)
MCH RBC QN AUTO: 31.6 PG (ref 26.1–32.9)
MCHC RBC AUTO-ENTMCNC: 32.8 G/DL (ref 31.4–35)
MCV RBC AUTO: 96.5 FL (ref 79.6–97.8)
MONOCYTES # BLD: 0.7 K/UL (ref 0.1–1.3)
MONOCYTES NFR BLD: 8 % (ref 4–12)
NEUTS SEG # BLD: 4.3 K/UL (ref 1.7–8.2)
NEUTS SEG NFR BLD: 53 % (ref 43–78)
NRBC # BLD: 0 K/UL (ref 0–0.2)
PLATELET # BLD AUTO: 217 K/UL (ref 150–450)
PMV BLD AUTO: 10.1 FL (ref 9.4–12.3)
POTASSIUM SERPL-SCNC: 3.7 MMOL/L (ref 3.5–5.1)
PROT SERPL-MCNC: 7.9 G/DL (ref 6.3–8.2)
RBC # BLD AUTO: 3.76 M/UL (ref 4.05–5.2)
SODIUM SERPL-SCNC: 139 MMOL/L (ref 136–145)
TROPONIN I SERPL-MCNC: <0.02 NG/ML (ref 0.02–0.05)
TROPONIN I SERPL-MCNC: <0.02 NG/ML (ref 0.02–0.05)
WBC # BLD AUTO: 8.3 K/UL (ref 4.3–11.1)

## 2020-01-25 PROCEDURE — 93005 ELECTROCARDIOGRAM TRACING: CPT | Performed by: EMERGENCY MEDICINE

## 2020-01-25 PROCEDURE — 71046 X-RAY EXAM CHEST 2 VIEWS: CPT

## 2020-01-25 PROCEDURE — 85025 COMPLETE CBC W/AUTO DIFF WBC: CPT

## 2020-01-25 PROCEDURE — 80053 COMPREHEN METABOLIC PANEL: CPT

## 2020-01-25 PROCEDURE — 99284 EMERGENCY DEPT VISIT MOD MDM: CPT

## 2020-01-25 PROCEDURE — 84484 ASSAY OF TROPONIN QUANT: CPT

## 2020-01-25 NOTE — ED TRIAGE NOTES
Pt has been having mid chest pain that radiates to her back for 45 minutes to 1 hour. Describes pain as sharp that turned dull and achy. Pt has 2 stents and takes plavix. Sees Dr Marychuy Page with [facility] cardiology.

## 2020-01-26 LAB
ATRIAL RATE: 68 BPM
CALCULATED P AXIS, ECG09: 57 DEGREES
CALCULATED R AXIS, ECG10: 60 DEGREES
CALCULATED T AXIS, ECG11: 56 DEGREES
DIAGNOSIS, 93000: NORMAL
P-R INTERVAL, ECG05: 170 MS
Q-T INTERVAL, ECG07: 406 MS
QRS DURATION, ECG06: 76 MS
QTC CALCULATION (BEZET), ECG08: 431 MS
VENTRICULAR RATE, ECG03: 68 BPM

## 2020-01-26 NOTE — ED NOTES
I have reviewed discharge instructions with the patient. The patient verbalized understanding. Patient left ED via Discharge Method: ambulatory to Home with self. Opportunity for questions and clarification provided. Patient given 0 scripts. To continue your aftercare when you leave the hospital, you may receive an automated call from our care team to check in on how you are doing. This is a free service and part of our promise to provide the best care and service to meet your aftercare needs.  If you have questions, or wish to unsubscribe from this service please call [phone]. Thank you for Choosing our [facility] Emergency Department.

## 2020-01-26 NOTE — ED PROVIDER NOTES
Patient is a 60-year-old female with a history of coronary artery disease and prior stents placed about 4 months ago. She comes to the ER today complaining of substernal chest pain. She states about 2 hours ago while shopping she developed a sharp crampy pain in the substernal chest.  Radiated through to the back. No diaphoresis. No shortness of breath. No nausea. Feels better now. The history is provided by the patient. Chest Pain    This is a new problem. The current episode started 1 to 2 hours ago. The problem has been resolved. The problem occurs constantly. The pain is associated with normal activity. The pain is present in the substernal region. The pain is mild. The quality of the pain is described as sharp. The pain radiates to the mid back. The symptoms are aggravated by palpation. Pertinent negatives include no abdominal pain, no back pain, no cough, no diaphoresis, no fever, no hemoptysis, no irregular heartbeat, no lower extremity edema, no nausea, no palpitations, no shortness of breath, no vomiting and no weakness. Risk factors include cardiac disease. Procedural history includes cardiac catheterization and cardiac stents.        Past Medical History:   Diagnosis Date    Arthritis     CAD (coronary artery disease) 9/25/2019    Endometriosis 1973    Fibrocystic breast changes     Sleep apnea        Past Surgical History:   Procedure Laterality Date    HX OOPHORECTOMY Right     HX TONSILLECTOMY           Family History:   Problem Relation Age of Onset    Ovarian Cancer Mother     Stomach Cancer Mother     Cancer Father         liver    Heart Failure Maternal Grandmother        Social History     Socioeconomic History    Marital status:      Spouse name: Not on file    Number of children: Not on file    Years of education: Not on file    Highest education level: Not on file   Occupational History    Not on file   Social Needs    Financial resource strain: Not on file   Rachel Claire Food insecurity:     Worry: Not on file     Inability: Not on file    Transportation needs:     Medical: Not on file     Non-medical: Not on file   Tobacco Use    Smoking status: Former Smoker     Last attempt to quit: 2019     Years since quittin.4    Smokeless tobacco: Never Used   Substance and Sexual Activity    Alcohol use: Yes     Frequency: Never     Comment: occasional    Drug use: No    Sexual activity: Not on file   Lifestyle    Physical activity:     Days per week: Not on file     Minutes per session: Not on file    Stress: Not on file   Relationships    Social connections:     Talks on phone: Not on file     Gets together: Not on file     Attends Jehov[employer]'s witness service: Not on file     Active member of club or organization: Not on file     Attends meetings of clubs or organizations: Not on file     Relationship status: Not on file    Intimate partner violence:     Fear of current or ex partner: Not on file     Emotionally abused: Not on file     Physically abused: Not on file     Forced sexual activity: Not on file   Other Topics Concern    Not on file   Social History Narrative    Not on file         ALLERGIES: Adhesive tape-silicones    Review of Systems   Constitutional: Negative for chills, diaphoresis, fatigue and fever. HENT: Negative for congestion, rhinorrhea and sore throat. Eyes: Negative for pain, discharge and visual disturbance. Respiratory: Negative for cough, hemoptysis and shortness of breath. Cardiovascular: Positive for chest pain. Negative for palpitations. Gastrointestinal: Negative for abdominal pain, diarrhea, nausea and vomiting. Endocrine: Negative for polydipsia and polyuria. Genitourinary: Negative for dysuria, frequency and urgency. Musculoskeletal: Negative for back pain and neck pain. Skin: Negative for rash. Neurological: Negative for seizures, syncope and weakness. Hematological: Negative.         Vitals:    20 1746   BP: 152/65 Pulse: 63   Resp: 18   Temp: 98 °F (36.7 °C)   SpO2: 100%   Weight: 84.8 kg (187 lb)   Height: 5' 7\" (1.702 m)            Physical Exam  Vitals signs and nursing note reviewed. Constitutional:       Appearance: She is well-developed. HENT:      Head: Normocephalic and atraumatic. Eyes:      Conjunctiva/sclera: Conjunctivae normal.      Pupils: Pupils are equal, round, and reactive to light. Neck:      Musculoskeletal: Normal range of motion and neck supple. Cardiovascular:      Rate and Rhythm: Normal rate and regular rhythm. Pulmonary:      Effort: Pulmonary effort is normal.      Breath sounds: Normal breath sounds. Chest:      Chest wall: Tenderness present. Comments: Palpation of the mid sternum reproduces her pain. Abdominal:      Palpations: Abdomen is soft. Tenderness: There is no tenderness. There is no guarding or rebound. Musculoskeletal: Normal range of motion. General: No deformity. Right lower leg: She exhibits no tenderness. Left lower leg: She exhibits no tenderness. Lymphadenopathy:      Cervical: No cervical adenopathy. Skin:     General: Skin is warm and dry. Capillary Refill: Capillary refill takes less than 2 seconds. Findings: No rash. Neurological:      Mental Status: She is alert and oriented to person, place, and time. GCS: GCS eye subscore is 4. GCS verbal subscore is 5. GCS motor subscore is 6. Cranial Nerves: No cranial nerve deficit. Sensory: No sensory deficit. Motor: No weakness. MDM  Number of Diagnoses or Management Options  Diagnosis management comments: EKG reviewed by me shows normal sinus rhythm rate of 68 no acute ischemia  Basic blood work unremarkable  Initial troponin 0    Records were reviewed and she did have 2 heart stents placed by [facility] cardiology in September 2019    Clinically this appears to represent a musculoskeletal chest wall pain.   We will recheck a repeat 2-hour troponin value to be safe. Chest x-ray is negative    8:09 PM  Repeat troponin stayed negative. We will discharge to home with musculoskeletal chest wall pain instructions. Voice dictation software was used during the making of this note. This software is not perfect and grammatical and other typographical errors may be present. This note has been proofread, but may still contain errors.   Ivonne Jarrett MD; 1/25/2020 @8:09 PM   ===================================================================         Amount and/or Complexity of Data Reviewed  Clinical lab tests: ordered and reviewed  Tests in the radiology section of CPT®: ordered and reviewed  Review and summarize past medical records: yes  Independent visualization of images, tracings, or specimens: yes    Risk of Complications, Morbidity, and/or Mortality  Presenting problems: moderate  Diagnostic procedures: low  Management options: low    Patient Progress  Patient progress: stable         Procedures

## 2020-01-26 NOTE — DISCHARGE INSTRUCTIONS
Use Tylenol for pain. Continue with your other medications. Follow-up with your doctor or return for any other acute concerns.

## 2022-03-19 PROBLEM — Z72.0 TOBACCO ABUSE: Status: ACTIVE | Noted: 2019-10-16

## 2022-03-19 PROBLEM — I25.118 CORONARY ARTERY DISEASE OF NATIVE ARTERY OF NATIVE HEART WITH STABLE ANGINA PECTORIS (HCC): Status: ACTIVE | Noted: 2019-09-25

## 2022-03-20 PROBLEM — E78.5 DYSLIPIDEMIA: Status: ACTIVE | Noted: 2019-09-25

## 2022-08-08 ENCOUNTER — TELEPHONE (OUTPATIENT)
Dept: CARDIOLOGY CLINIC | Age: 72
End: 2022-08-08

## 2022-08-08 DIAGNOSIS — R06.02 SOB (SHORTNESS OF BREATH): ICD-10-CM

## 2022-08-08 DIAGNOSIS — I25.118 CORONARY ARTERY DISEASE OF NATIVE ARTERY OF NATIVE HEART WITH STABLE ANGINA PECTORIS (HCC): Primary | ICD-10-CM

## 2022-08-08 NOTE — TELEPHONE ENCOUNTER
Having problems and PCP wants her to be seen. Please call after 4 if we can .  I gave her appt for 10/03 Please call

## 2022-08-08 NOTE — TELEPHONE ENCOUNTER
Spoke with patient- she saw PCP for pressure in chest and SOB. All testing was negative, but  Hx of stents. She reports increase in fatigue. Also waking up 2-3 times at night to urinate. I offered sooner appt with another cardiologist, but she declined.  I will have her added to the wait list.

## 2022-08-08 NOTE — TELEPHONE ENCOUNTER
Please schedule patient for nuclear stress test-Lexiscan and echocardiogram.  She may then be worked in with me after testing is completed.

## 2022-10-03 ENCOUNTER — OFFICE VISIT (OUTPATIENT)
Dept: CARDIOLOGY CLINIC | Age: 72
End: 2022-10-03
Payer: MEDICARE

## 2022-10-03 VITALS
BODY MASS INDEX: 31.86 KG/M2 | DIASTOLIC BLOOD PRESSURE: 80 MMHG | SYSTOLIC BLOOD PRESSURE: 138 MMHG | HEIGHT: 67 IN | WEIGHT: 203 LBS

## 2022-10-03 DIAGNOSIS — I25.118 CORONARY ARTERY DISEASE OF NATIVE ARTERY OF NATIVE HEART WITH STABLE ANGINA PECTORIS (HCC): ICD-10-CM

## 2022-10-03 DIAGNOSIS — Z72.0 TOBACCO ABUSE: ICD-10-CM

## 2022-10-03 DIAGNOSIS — E78.5 DYSLIPIDEMIA: ICD-10-CM

## 2022-10-03 DIAGNOSIS — I25.118 CORONARY ARTERY DISEASE OF NATIVE ARTERY OF NATIVE HEART WITH STABLE ANGINA PECTORIS (HCC): Primary | ICD-10-CM

## 2022-10-03 PROBLEM — I20.0 ACCELERATING ANGINA (HCC): Status: ACTIVE | Noted: 2022-10-03

## 2022-10-03 PROCEDURE — G8400 PT W/DXA NO RESULTS DOC: HCPCS | Performed by: INTERNAL MEDICINE

## 2022-10-03 PROCEDURE — 1036F TOBACCO NON-USER: CPT | Performed by: INTERNAL MEDICINE

## 2022-10-03 PROCEDURE — 1123F ACP DISCUSS/DSCN MKR DOCD: CPT | Performed by: INTERNAL MEDICINE

## 2022-10-03 PROCEDURE — 3017F COLORECTAL CA SCREEN DOC REV: CPT | Performed by: INTERNAL MEDICINE

## 2022-10-03 PROCEDURE — G8417 CALC BMI ABV UP PARAM F/U: HCPCS | Performed by: INTERNAL MEDICINE

## 2022-10-03 PROCEDURE — 99214 OFFICE O/P EST MOD 30 MIN: CPT | Performed by: INTERNAL MEDICINE

## 2022-10-03 PROCEDURE — G8484 FLU IMMUNIZE NO ADMIN: HCPCS | Performed by: INTERNAL MEDICINE

## 2022-10-03 PROCEDURE — 93000 ELECTROCARDIOGRAM COMPLETE: CPT | Performed by: INTERNAL MEDICINE

## 2022-10-03 PROCEDURE — G8428 CUR MEDS NOT DOCUMENT: HCPCS | Performed by: INTERNAL MEDICINE

## 2022-10-03 PROCEDURE — 1090F PRES/ABSN URINE INCON ASSESS: CPT | Performed by: INTERNAL MEDICINE

## 2022-10-03 ASSESSMENT — ENCOUNTER SYMPTOMS
SHORTNESS OF BREATH: 0
COUGH: 0
BACK PAIN: 0
ABDOMINAL PAIN: 0

## 2022-10-03 NOTE — PROGRESS NOTES
[address], [address] [address]  [address] [place] [address]      10/03/22      NAME:  Maximino Espinal  : 1950  MRN: [id number]      SUBJECTIVE:   Maximino Espinal is a 67 y.o. female seen for a follow up visit regarding the following:     Chief Complaint   Patient presents with    Shortness of Breath    Chest Pain       HPI:   67 y.o. male with history of coronary disease and prior PCI and stenting. She presents now with worsening chest pressure and discomfort radiating to left arm. She has uncontrolled lipids and hypertension. Patient unfortunately continues to smoke. EKG today demonstrates nonspecific ST-T wave changes. Past Medical History, Past Surgical History, Family history, Social History, and Medications were all reviewed with the patient today and updated as necessary. Current Outpatient Medications   Medication Sig Dispense Refill    ascorbic acid (VITAMIN C) 500 MG tablet Take by mouth daily      aspirin 81 MG EC tablet Take 81 mg by mouth daily      atorvastatin (LIPITOR) 80 MG tablet Take 80 mg by mouth      vitamin D 25 MCG (1000 UT) CAPS Take by mouth daily      cyanocobalamin 1000 MCG tablet Take 1,000 mcg by mouth daily      nitroGLYCERIN (NITROSTAT) 0.4 MG SL tablet Place 0.4 mg under the tongue      clopidogrel (PLAVIX) 75 MG tablet Take 75 mg by mouth daily (Patient not taking: Reported on 10/3/2022)      ezetimibe (ZETIA) 10 MG tablet Take 10 mg by mouth daily (Patient not taking: Reported on 10/3/2022)       No current facility-administered medications for this visit.      Patient Active Problem List    Diagnosis Date Noted    Tobacco abuse 10/16/2019    Coronary artery disease of native artery of native heart with stable angina pectoris ([facility] [address] 2019:  3.0x22 [place] mLAD, 4.0x22 [place] dRCA        Dyslipidemia 2019      Family History   Problem Relation Age of Onset    Ovarian Cancer Mother     Stomach Cancer Mother     Cancer Father         liver    Heart Failure Maternal Grandmother      Social History     Tobacco Use    Smoking status: Former     Types: Cigarettes     Quit date: 8/24/2019     Years since quitting: 3.1    Smokeless tobacco: Never   Substance Use Topics    Alcohol use: Yes       Review Of Symptoms    Review of Systems   Constitutional: Negative for fever and malaise/fatigue. HENT:  Negative for nosebleeds. Cardiovascular:  Negative for chest pain, dyspnea on exertion and palpitations. Respiratory:  Negative for cough and shortness of breath. Musculoskeletal:  Negative for back pain, muscle cramps, muscle weakness and myalgias. Gastrointestinal:  Negative for abdominal pain. Neurological:  Negative for dizziness. Physical Exam  Blood pressure 138/80, height 5' 7\" (1.702 m), weight 203 lb (92.1 kg). Physical Exam  HENT:      Head: Normocephalic and atraumatic. Eyes:      Extraocular Movements: Extraocular movements intact. Cardiovascular:      Rate and Rhythm: Normal rate and regular rhythm. Heart sounds: No murmur heard. Pulmonary:      Effort: Pulmonary effort is normal.      Breath sounds: Normal breath sounds. Abdominal:      Palpations: Abdomen is soft. Musculoskeletal:         General: Normal range of motion. Skin:     General: Skin is warm and dry. Neurological:      General: No focal deficit present. Mental Status: She is oriented to person, place, and time. Medical problems, medical history and test results were reviewed with the patient today.       No results found for: CHOL  No results found for: TRIG  No results found for: HDL  No results found for: LDLCHOLESTEROL, LDLCALC  No results found for: LABVLDL, VLDL  No results found for: CHOLHDLRATIO     No results found for: LABA1C  No results found for: EAG     Lab Results   Component Value Date     01/25/2020    K 3.7 01/25/2020     01/25/2020    CO2 30 01/25/2020    BUN 15 01/25/2020    CREATININE 0.82 01/25/2020    GLUCOSE 96 01/25/2020    CALCIUM 9.2 01/25/2020    PROT 7.9 01/25/2020    LABALBU 4.0 01/25/2020    BILITOT 0.6 01/25/2020    ALKPHOS 80 01/25/2020    AST 17 01/25/2020    ALT 24 01/25/2020    GFRAA >60 01/25/2020    AGRATIO 1.0 (L) 01/25/2020    GLOB 3.9 (H) 01/25/2020       Lab Results   Component Value Date/Time     01/25/2020 05:48 PM    K 3.7 01/25/2020 05:48 PM     01/25/2020 05:48 PM    CO2 30 01/25/2020 05:48 PM    BUN 15 01/25/2020 05:48 PM    CREATININE 0.82 01/25/2020 05:48 PM    GLUCOSE 96 01/25/2020 05:48 PM    CALCIUM 9.2 01/25/2020 05:48 PM        Lab Results   Component Value Date    WBC 8.3 01/25/2020    HGB 11.9 01/25/2020    HCT 36.3 01/25/2020    MCV 96.5 01/25/2020     01/25/2020             ASSESSMENT:    Magda Gomes was seen today for shortness of breath and chest pain. Diagnoses and all orders for this visit:    Coronary artery disease of native artery of native heart with stable angina pectoris ([facility] [address] -patient with signs and symptoms consistent with accelerating angina. She has established coronary artery disease and prior PCI and stenting. Recommend proceeding with cardiac catheterization. Risk benefits of procedure discussed with patient she is willing to proceed. -     EKG 12 Lead  -     Case Request Cardiac Cath Lab  -     Basic Metabolic Panel; Future  -     CBC; Future    Dyslipidemia -LDL suboptimally controlled on atorvastatin 80 mg daily. We will need to consider the addition of Repatha. Tobacco abuse -cessation education    Primary hypertension -monitor closely for now. Patient may benefit from therapies to more optimally control blood pressure.         Problem List Items Addressed This Visit          Circulatory    Coronary artery disease of native artery of native heart with stable angina pectoris ([facility] [address] - Primary    Relevant Orders    EKG 12 Lead (Completed)    Case Request Cardiac Cath Lab    Basic Metabolic Panel    CBC       Other Tobacco abuse    Dyslipidemia       There are no discontinued medications. Patient has been instructed and agrees to call our office with any issues or other concerns related to their cardiac condition(s) and/or complaint(s). Return for Return after hospital procedure.        Irwin Jolley MD  10/3/2022

## 2022-10-04 ENCOUNTER — HOSPITAL ENCOUNTER (OUTPATIENT)
Age: 72
Setting detail: OUTPATIENT SURGERY
Discharge: HOME | End: 2022-10-04
Attending: INTERNAL MEDICINE | Admitting: INTERNAL MEDICINE
Payer: MEDICARE

## 2022-10-04 VITALS
TEMPERATURE: 97.7 F | SYSTOLIC BLOOD PRESSURE: 107 MMHG | OXYGEN SATURATION: 97 % | HEIGHT: 67 IN | DIASTOLIC BLOOD PRESSURE: 63 MMHG | WEIGHT: 203 LBS | BODY MASS INDEX: 31.86 KG/M2 | RESPIRATION RATE: 14 BRPM | HEART RATE: 56 BPM

## 2022-10-04 DIAGNOSIS — I20.0 ACCELERATING ANGINA (HCC): ICD-10-CM

## 2022-10-04 LAB
ANION GAP SERPL CALC-SCNC: 4 MMOL/L (ref 4–13)
BUN SERPL-MCNC: 14 MG/DL (ref 8–23)
CALCIUM SERPL-MCNC: 9.6 MG/DL (ref 8.3–10.4)
CHLORIDE SERPL-SCNC: 106 MMOL/L (ref 101–110)
CO2 SERPL-SCNC: 30 MMOL/L (ref 21–32)
CREAT SERPL-MCNC: 0.8 MG/DL (ref 0.6–1)
ECHO BSA: 2.09 M2
EKG ATRIAL RATE: 52 BPM
EKG DIAGNOSIS: NORMAL
EKG P AXIS: 48 DEGREES
EKG P-R INTERVAL: 166 MS
EKG Q-T INTERVAL: 436 MS
EKG QRS DURATION: 82 MS
EKG QTC CALCULATION (BAZETT): 405 MS
EKG R AXIS: 20 DEGREES
EKG T AXIS: 29 DEGREES
EKG VENTRICULAR RATE: 52 BPM
ERYTHROCYTE [DISTWIDTH] IN BLOOD BY AUTOMATED COUNT: 13.5 % (ref 11.9–14.6)
GLUCOSE SERPL-MCNC: 102 MG/DL (ref 65–100)
HCT VFR BLD AUTO: 38.6 % (ref 35.8–46.3)
HGB BLD-MCNC: 12.5 G/DL (ref 11.7–15.4)
MCH RBC QN AUTO: 32.2 PG (ref 26.1–32.9)
MCHC RBC AUTO-ENTMCNC: 32.4 G/DL (ref 31.4–35)
MCV RBC AUTO: 99.5 FL (ref 79.6–97.8)
NRBC # BLD: 0 K/UL (ref 0–0.2)
PLATELET # BLD AUTO: 215 K/UL (ref 150–450)
PMV BLD AUTO: 10.8 FL (ref 9.4–12.3)
POTASSIUM SERPL-SCNC: 4.2 MMOL/L (ref 3.5–5.1)
RBC # BLD AUTO: 3.88 M/UL (ref 4.05–5.2)
SODIUM SERPL-SCNC: 140 MMOL/L (ref 136–145)
WBC # BLD AUTO: 7.3 K/UL (ref 4.3–11.1)

## 2022-10-04 PROCEDURE — C1894 INTRO/SHEATH, NON-LASER: HCPCS | Performed by: INTERNAL MEDICINE

## 2022-10-04 PROCEDURE — 92943 PRQ TRLUML REVSC CH OCC ANT: CPT | Performed by: INTERNAL MEDICINE

## 2022-10-04 PROCEDURE — C1725 CATH, TRANSLUMIN NON-LASER: HCPCS | Performed by: INTERNAL MEDICINE

## 2022-10-04 PROCEDURE — 93005 ELECTROCARDIOGRAM TRACING: CPT | Performed by: INTERNAL MEDICINE

## 2022-10-04 PROCEDURE — 6360000002 HC RX W HCPCS: Performed by: INTERNAL MEDICINE

## 2022-10-04 PROCEDURE — 99153 MOD SED SAME PHYS/QHP EA: CPT | Performed by: INTERNAL MEDICINE

## 2022-10-04 PROCEDURE — 6370000000 HC RX 637 (ALT 250 FOR IP): Performed by: INTERNAL MEDICINE

## 2022-10-04 PROCEDURE — 99152 MOD SED SAME PHYS/QHP 5/>YRS: CPT | Performed by: INTERNAL MEDICINE

## 2022-10-04 PROCEDURE — C1887 CATHETER, GUIDING: HCPCS | Performed by: INTERNAL MEDICINE

## 2022-10-04 PROCEDURE — 2580000003 HC RX 258: Performed by: INTERNAL MEDICINE

## 2022-10-04 PROCEDURE — C1769 GUIDE WIRE: HCPCS | Performed by: INTERNAL MEDICINE

## 2022-10-04 PROCEDURE — 93458 L HRT ARTERY/VENTRICLE ANGIO: CPT | Performed by: INTERNAL MEDICINE

## 2022-10-04 PROCEDURE — 2500000003 HC RX 250 WO HCPCS: Performed by: INTERNAL MEDICINE

## 2022-10-04 PROCEDURE — 2709999900 HC NON-CHARGEABLE SUPPLY: Performed by: INTERNAL MEDICINE

## 2022-10-04 PROCEDURE — 6360000004 HC RX CONTRAST MEDICATION: Performed by: INTERNAL MEDICINE

## 2022-10-04 RX ORDER — SODIUM CHLORIDE 0.9 % (FLUSH) 0.9 %
5-40 SYRINGE (ML) INJECTION PRN
Status: DISCONTINUED | OUTPATIENT
Start: 2022-10-04 | End: 2022-10-04 | Stop reason: HOSPADM

## 2022-10-04 RX ORDER — LIDOCAINE HYDROCHLORIDE 10 MG/ML
INJECTION, SOLUTION INFILTRATION; PERINEURAL PRN
Status: DISCONTINUED | OUTPATIENT
Start: 2022-10-04 | End: 2022-10-04 | Stop reason: HOSPADM

## 2022-10-04 RX ORDER — MIDAZOLAM HYDROCHLORIDE 1 MG/ML
INJECTION INTRAMUSCULAR; INTRAVENOUS PRN
Status: DISCONTINUED | OUTPATIENT
Start: 2022-10-04 | End: 2022-10-04 | Stop reason: HOSPADM

## 2022-10-04 RX ORDER — CLOPIDOGREL BISULFATE 75 MG/1
TABLET ORAL PRN
Status: DISCONTINUED | OUTPATIENT
Start: 2022-10-04 | End: 2022-10-04 | Stop reason: HOSPADM

## 2022-10-04 RX ORDER — SODIUM CHLORIDE 9 MG/ML
INJECTION, SOLUTION INTRAVENOUS CONTINUOUS
Status: DISCONTINUED | OUTPATIENT
Start: 2022-10-04 | End: 2022-10-04 | Stop reason: HOSPADM

## 2022-10-04 RX ORDER — BIVALIRUDIN 250 MG/5ML
INJECTION, POWDER, LYOPHILIZED, FOR SOLUTION INTRAVENOUS PRN
Status: DISCONTINUED | OUTPATIENT
Start: 2022-10-04 | End: 2022-10-04 | Stop reason: HOSPADM

## 2022-10-04 RX ORDER — EZETIMIBE 10 MG/1
10 TABLET ORAL DAILY
Qty: 90 TABLET | Refills: 3 | Status: SHIPPED | OUTPATIENT
Start: 2022-10-04

## 2022-10-04 RX ORDER — SODIUM CHLORIDE 0.9 % (FLUSH) 0.9 %
5-40 SYRINGE (ML) INJECTION EVERY 12 HOURS SCHEDULED
Status: DISCONTINUED | OUTPATIENT
Start: 2022-10-04 | End: 2022-10-04 | Stop reason: HOSPADM

## 2022-10-04 RX ORDER — MAGNESIUM HYDROXIDE/ALUMINUM HYDROXICE/SIMETHICONE 120; 1200; 1200 MG/30ML; MG/30ML; MG/30ML
SUSPENSION ORAL PRN
Status: DISCONTINUED | OUTPATIENT
Start: 2022-10-04 | End: 2022-10-04 | Stop reason: HOSPADM

## 2022-10-04 RX ORDER — CLOPIDOGREL BISULFATE 75 MG/1
75 TABLET ORAL DAILY
Qty: 90 TABLET | Refills: 3 | Status: SHIPPED | OUTPATIENT
Start: 2022-10-04

## 2022-10-04 RX ORDER — HEPARIN SODIUM 200 [USP'U]/100ML
INJECTION, SOLUTION INTRAVENOUS CONTINUOUS PRN
Status: DISCONTINUED | OUTPATIENT
Start: 2022-10-04 | End: 2022-10-04 | Stop reason: HOSPADM

## 2022-10-04 RX ORDER — ASPIRIN 81 MG/1
324 TABLET, CHEWABLE ORAL DAILY
Status: DISCONTINUED | OUTPATIENT
Start: 2022-10-04 | End: 2022-10-04 | Stop reason: HOSPADM

## 2022-10-04 RX ORDER — ACETAMINOPHEN 325 MG/1
650 TABLET ORAL EVERY 4 HOURS PRN
Status: DISCONTINUED | OUTPATIENT
Start: 2022-10-04 | End: 2022-10-04 | Stop reason: HOSPADM

## 2022-10-04 NOTE — PROGRESS NOTES
TRANSFER - IN REPORT:    Verbal report received from [address]  RN on Ravinder Dee being received from [address] [place] [address] for routine post-op      Report consisted of patients Situation, Background, Assessment and Recommendations(SBAR). Information from the following report(s) Procedure Summary was reviewed with the receiving nurse. Opportunity for questions and clarification was provided. Assessment completed upon patients arrival to unit and care assumed.

## 2022-10-04 NOTE — PROGRESS NOTES
Patient received to [address] [place] [address] room # 14  Ambulatory from [facility]. Patient scheduled for C today with Dr Martina Amador. Procedure reviewed & questions answered, voiced good understanding consent obtained & placed on chart. All medications and medical history reviewed. Will prep patient per orders. Patient & family updated on plan of care. The patient has a fraility score of 3-MANAGING WELL, based on ambulation without assistance.    Patient took Aspirin 324mg today at 0600 prior to arrival.

## 2022-10-04 NOTE — Clinical Note
Aspirin Registry Question:   Aspirin was given prior to the procedure.    [email or web address]

## 2022-10-04 NOTE — DISCHARGE INSTRUCTIONS
HEART CATHETERIZATION/ANGIOGRAPHY DISCHARGE INSTRUCTIONS    Check puncture site frequently for swelling or bleeding. If there is any bleeding, apply pressure over the area with a clean towel or washcloth and call 911. Notify your doctor for any redness, swelling, drainage, or oozing from the puncture site. Notify your doctor for any fever or chills. If the extremity becomes cold, numb, or painful call [facility] Cardiology  Activity should be limited for the next 48 hours. No heavy lifting, pushing, pulling  or strenuous activity for 48 hours. No heavy lifting (anything over 10 pounds) for 3 days. You may resume your usual diet. Drink more fluids than usual.  Have a responsible person drive you home and stay with you for at least 24 hours after your heart catheterization/angiography. You may remove bandage from your R wrist in 24 hours. You may shower in 24 hours. No tub baths, hot tubs, or swimming for 1 week. Do not place any lotions, creams, powders, or ointments over puncture site for 1 week. You may place a clean band-aid over the puncture site each day for 5 days. Change daily. Sedation for a Medical Procedure: Care Instructions     You were given a sedative medication during your visit. While many of the effects will have worn   off before you leave; you may continue to feel some effects for several hours. Common side effects from sedation include:  Feeling sleepy. (Your doctors and nurses will make sure you are not too sleepy to go home.)  Nausea and vomiting. This usually does not last long. Feeling tired. How can you care for yourself at home? Activity    Don't do anything for 24 hours that requires attention to detail. It takes time for the medicine effects to completely wear off. Do not make important legal decisions for 24 hours. Do not sign any legal documents for 24 hours.      Do not drink alcohol today     For your safety, you should not drive or operate heavy machinery for the remainder of the day     Rest when you feel tired. Getting enough sleep will help you recover. Diet    You can eat your normal diet, unless your doctor gives you other instructions. If your stomach is upset, try clear liquids and bland, low-fat foods like plain toast or rice. Drink plenty of fluids (unless your doctor tells you not to). Don't drink alcohol for 24 hours. Medicines    Be safe with medicines. Read and follow all instructions on the label. If the doctor gave you a prescription medicine for pain, take it as prescribed. If you are not taking a prescription pain medicine, ask your doctor if you can take an over-the-counter medicine. If you think your pain medicine is making you sick to your stomach: Take your medicine after meals (unless your doctor has told you not to). Ask your doctor for a different pain medicine. I have read the above instructions and have had the opportunity to ask questions.       Patient: ________________________   Date: _____________    Witness: _______________________   Date: _____________

## 2022-10-04 NOTE — PROGRESS NOTES
TRANSFER - OUT REPORT:    [facility] with Dr. Keri Salinas  Access: Right radial   No intervention,  of RCA    Tr band applied to right radial with 14 ml in band  No bleeding or hematoma site soft    MAR  5 mg versed  50 mcg Fentanyl   5,000 units heparin  Angiomax bolus & gtt; gtt stopped at 1008      Verbal report given to Tannavalerie gaona on Jay Montoya  being transferred to [facility] for routine progression of patient care       Report consisted of patient's Situation, Background, Assessment and Recommendations(SBAR). Information from the following report(s) Nurse Handoff Report and MAR was reviewed with the receiving nurse. Opportunity for questions and clarification was provided.       Patient transported with:  Registered Nurse

## 2022-10-04 NOTE — PROGRESS NOTES
[address] [facility], [address]  PHONE: [phone]      To Whom It May Concern:          Amy Lombardo was in our facility for a scheduled procedure 10/4/22 . Lifting restrictions in place for three days. May return to work 10/7/22 with no lifting restrictions. Please consider this an excused absence. If you have any questions feel free to contact [facility] Cardiology.     Per Jane Crews RN

## 2022-10-31 ENCOUNTER — OFFICE VISIT (OUTPATIENT)
Dept: CARDIOLOGY CLINIC | Age: 72
End: 2022-10-31
Payer: MEDICARE

## 2022-10-31 VITALS
WEIGHT: 202 LBS | HEART RATE: 68 BPM | DIASTOLIC BLOOD PRESSURE: 54 MMHG | HEIGHT: 67 IN | BODY MASS INDEX: 31.71 KG/M2 | SYSTOLIC BLOOD PRESSURE: 110 MMHG

## 2022-10-31 DIAGNOSIS — I25.118 CORONARY ARTERY DISEASE OF NATIVE ARTERY OF NATIVE HEART WITH STABLE ANGINA PECTORIS (HCC): Primary | ICD-10-CM

## 2022-10-31 DIAGNOSIS — I25.118 CORONARY ARTERY DISEASE OF NATIVE ARTERY OF NATIVE HEART WITH STABLE ANGINA PECTORIS (HCC): ICD-10-CM

## 2022-10-31 LAB
ANION GAP SERPL CALC-SCNC: 5 MMOL/L (ref 2–11)
BUN SERPL-MCNC: 8 MG/DL (ref 8–23)
CALCIUM SERPL-MCNC: 9.5 MG/DL (ref 8.3–10.4)
CHLORIDE SERPL-SCNC: 107 MMOL/L (ref 101–110)
CO2 SERPL-SCNC: 29 MMOL/L (ref 21–32)
CREAT SERPL-MCNC: 0.7 MG/DL (ref 0.6–1)
ERYTHROCYTE [DISTWIDTH] IN BLOOD BY AUTOMATED COUNT: 13.5 % (ref 11.9–14.6)
GLUCOSE SERPL-MCNC: 111 MG/DL (ref 65–100)
HCT VFR BLD AUTO: 38.3 % (ref 35.8–46.3)
HGB BLD-MCNC: 12.4 G/DL (ref 11.7–15.4)
MCH RBC QN AUTO: 32 PG (ref 26.1–32.9)
MCHC RBC AUTO-ENTMCNC: 32.4 G/DL (ref 31.4–35)
MCV RBC AUTO: 98.7 FL (ref 82–102)
NRBC # BLD: 0 K/UL (ref 0–0.2)
PLATELET # BLD AUTO: 227 K/UL (ref 150–450)
PMV BLD AUTO: 10.5 FL (ref 9.4–12.3)
POTASSIUM SERPL-SCNC: 3.7 MMOL/L (ref 3.5–5.1)
RBC # BLD AUTO: 3.88 M/UL (ref 4.05–5.2)
SODIUM SERPL-SCNC: 141 MMOL/L (ref 133–143)
WBC # BLD AUTO: 5.6 K/UL (ref 4.3–11.1)

## 2022-10-31 PROCEDURE — G8484 FLU IMMUNIZE NO ADMIN: HCPCS | Performed by: INTERNAL MEDICINE

## 2022-10-31 PROCEDURE — 4004F PT TOBACCO SCREEN RCVD TLK: CPT | Performed by: INTERNAL MEDICINE

## 2022-10-31 PROCEDURE — G8427 DOCREV CUR MEDS BY ELIG CLIN: HCPCS | Performed by: INTERNAL MEDICINE

## 2022-10-31 PROCEDURE — 1123F ACP DISCUSS/DSCN MKR DOCD: CPT | Performed by: INTERNAL MEDICINE

## 2022-10-31 PROCEDURE — G8400 PT W/DXA NO RESULTS DOC: HCPCS | Performed by: INTERNAL MEDICINE

## 2022-10-31 PROCEDURE — 1090F PRES/ABSN URINE INCON ASSESS: CPT | Performed by: INTERNAL MEDICINE

## 2022-10-31 PROCEDURE — G8417 CALC BMI ABV UP PARAM F/U: HCPCS | Performed by: INTERNAL MEDICINE

## 2022-10-31 PROCEDURE — 99214 OFFICE O/P EST MOD 30 MIN: CPT | Performed by: INTERNAL MEDICINE

## 2022-10-31 PROCEDURE — 3017F COLORECTAL CA SCREEN DOC REV: CPT | Performed by: INTERNAL MEDICINE

## 2022-10-31 RX ORDER — CYCLOBENZAPRINE HCL 10 MG
10 TABLET ORAL PRN
COMMUNITY
Start: 2022-09-06

## 2022-10-31 RX ORDER — MELOXICAM 15 MG/1
15 TABLET ORAL DAILY PRN
COMMUNITY
Start: 2022-05-25

## 2022-10-31 ASSESSMENT — ENCOUNTER SYMPTOMS
ABDOMINAL PAIN: 0
SHORTNESS OF BREATH: 0
BACK PAIN: 0
GASTROINTESTINAL NEGATIVE: 1
EYE PAIN: 0
EYES NEGATIVE: 1
ALLERGIC/IMMUNOLOGIC NEGATIVE: 1
PHOTOPHOBIA: 0
CHEST TIGHTNESS: 0
RESPIRATORY NEGATIVE: 1

## 2022-10-31 NOTE — PROGRESS NOTES
[address], [address]  PHONE: [phone]    Tony Bellova 19  1950    SUBJECTIVE:   Taco Lamas is a 67 y.o. 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 She continues to have ongoing fatigue and dyspnea on exertion. There was an attempt made at revascularization that was unsuccessful. She is referred today for consideration of  of this lesion. Past Medical History, Past Surgical History, Family history, Social History, and Medications were all reviewed with the patient today and updated as necessary.        Current Outpatient Medications:     cyclobenzaprine (FLEXERIL) 10 MG tablet, Take 10 mg by mouth as needed, Disp: , Rfl:     meloxicam (MOBIC) 15 MG tablet, Take 15 mg by mouth daily as needed, Disp: , Rfl:     clopidogrel (PLAVIX) 75 MG tablet, Take 1 tablet by mouth daily, Disp: 90 tablet, Rfl: 3    ezetimibe (ZETIA) 10 MG tablet, Take 1 tablet by mouth daily, Disp: 90 tablet, Rfl: 3    ascorbic acid (VITAMIN C) 500 MG tablet, Take by mouth daily, Disp: , Rfl:     aspirin 81 MG EC tablet, Take 81 mg by mouth daily, Disp: , Rfl:     vitamin D 25 MCG (1000 UT) CAPS, Take by mouth daily, Disp: , Rfl:     cyanocobalamin 1000 MCG tablet, Take 1,000 mcg by mouth daily, Disp: , Rfl:     nitroGLYCERIN (NITROSTAT) 0.4 MG SL tablet, Place 0.4 mg under the tongue, Disp: , Rfl:     atorvastatin (LIPITOR) 80 MG tablet, Take 80 mg by mouth (Patient not taking: Reported on 10/31/2022), Disp: , Rfl:   Allergies   Allergen Reactions    Adhesive Tape Past Medical History:   Diagnosis Date    Arthritis     CAD (coronary artery disease) 9/25/2019    Endometriosis 1973    Fibrocystic breast changes     Sleep apnea      Past Surgical History:   Procedure Laterality Date    CARDIAC PROCEDURE N/A 10/4/2022    Left heart cath / coronary angiography performed by Magda Cohen MD at [facility] CARDIAC CATH LAB    CARDIAC PROCEDURE N/A 10/4/2022    Percutaneous coronary intervention performed by Magda Cohen MD at [facility] CARDIAC CATH LAB    OVARY REMOVAL Right     TONSILLECTOMY       Family History   Problem Relation Age of Onset    Ovarian Cancer Mother     Stomach Cancer Mother     Cancer Father         liver    Heart Failure Maternal Grandmother      Social History     Tobacco Use    Smoking status: Some Days     Types: Cigarettes    Smokeless tobacco: Never   Substance Use Topics    Alcohol use: Yes       ROS:  Review of Systems   Constitutional: Negative. Negative for fever. HENT:  Negative for hearing loss, nosebleeds and tinnitus. Eyes: Negative. Negative for photophobia and pain. Respiratory: Negative. Negative for chest tightness and shortness of breath. Cardiovascular: Negative. Negative for chest pain, palpitations and leg swelling. Gastrointestinal: Negative. Negative for abdominal pain. Endocrine: Negative. Negative for cold intolerance and heat intolerance. Genitourinary: Negative. Negative for dysuria. Musculoskeletal: Negative. Negative for back pain and joint swelling. Skin: Negative. Negative for rash. Allergic/Immunologic: Negative. Negative for immunocompromised state. Neurological: Negative. Negative for dizziness, syncope and light-headedness. Hematological: Negative. Does not bruise/bleed easily. Psychiatric/Behavioral: Negative. Negative for suicidal ideas. PHYSICAL EXAM:  Physical Exam  Constitutional:       General: She is not in acute distress. Appearance: She is not ill-appearing. HENT:      Head: Normocephalic and atraumatic. Nose: No congestion. Mouth/Throat:      Mouth: Mucous membranes are moist.   Eyes:      Extraocular Movements: Extraocular movements intact. Pupils: Pupils are equal, round, and reactive to light. Cardiovascular:      Rate and Rhythm: Normal rate and regular rhythm. Heart sounds: No murmur heard. No friction rub. No gallop. Pulmonary:      Effort: No respiratory distress. Breath sounds: No wheezing or rhonchi. Musculoskeletal:         General: No swelling. Cervical back: Normal range of motion. Right lower leg: No edema. Left lower leg: No edema. Skin:     General: Skin is warm and dry. Findings: No rash. Neurological:      General: No focal deficit present. Mental Status: She is oriented to person, place, and time. Psychiatric:         Mood and Affect: Mood normal.         Behavior: Behavior normal.         Judgment: Judgment normal.        BP (!) 110/54   Pulse 68   Ht 5' 7\" (1.702 m)   Wt 202 lb (91.6 kg)   BMI 31.64 kg/m²      Wt Readings from Last 10 Encounters:   10/31/22 202 lb (91.6 kg)   10/04/22 203 lb (92.1 kg)   10/03/22 203 lb (92.1 kg)   09/02/22 195 lb (88.5 kg)           Medical problems and test results were reviewed with the patient today. Lab Results   Component Value Date/Time    BUN 14 10/03/2022 12:47 PM     No results found for: ASHLEY, CREAPOC, CREA  Lab Results   Component Value Date/Time    K 4.2 10/03/2022 12:47 PM       No results found for: CHOL, CHOLPOCT, CHOLX, CHLST, CHOLV, HDL, HDLPOC, HDLC, LDL, LDLC, VLDLC, VLDL, TGLX, TRIGL    ASSESSMENT and PLAN    ICD-10-CM    1. Coronary artery disease of native artery of native heart with stable angina pectoris [facility])  I25.118 Case Request Cardiac Cath Lab     CBC     Basic Metabolic Panel          IMPRESSION:  1) CAD -there is a large area of myocardium perfused by her right coronary artery.   This is currently relying on left-to-right collateral flow. She continues to be symptomatic despite these collaterals. I think at this point is not unreasonable to attempt a  of her right coronary artery. This would be done using both femoral and radial access into catheters. We explained the risks and benefits, including the increased risk of coronary perforation and dissection. Discussed risk of cardiac catheterization with patient in detail. The risk of a major complication (death, MI or major embolization) during or after cardiac catheterization is below 1%. Risk of mortality is under 0.1%. Local complications at the site of catheter insertion were discussed. This includes but not limited to:  acute thrombosis, distal embolization, dissection, poorly controlled bleeding, hematoma, retroperitoneal hemorrhage, pseudoaneurysm or AV fistula formation. Other potential serious complication were discussed including risk of cardiac arrhythmias, allergic reactions, atheroemboli and acute kidney injury. ORDERS  Orders Placed This Encounter    CBC     Standing Status:   Future     Standing Expiration Date:   57/89/2870    Basic Metabolic Panel     Standing Status:   Future     Standing Expiration Date:   10/31/2023    cyclobenzaprine (FLEXERIL) 10 MG tablet     Sig: Take 10 mg by mouth as needed    meloxicam (MOBIC) 15 MG tablet     Sig: Take 15 mg by mouth daily as needed       Follow up in 1 months. Thank you for allowing me to participate in this patient's care. Please call or contact me if there are any questions or concerns regarding the above.       Belle Mcfarland MD  10/31/22  11:48 AM

## 2022-11-08 NOTE — PROGRESS NOTES
Patient pre-assessment complete for Damion Stout scheduled for , arrival time 0600. Patient verified using . Patient instructed to bring all home medications in labeled bottles on the day of procedure. NPO status reinforced. Patient instructed to take aspirin 324mg and plavix 75mg the morning of the procedure. Instructed they can take all other medications excluding vitamins & supplements. Patient verbalizes understanding of all instructions & denies any questions at this time.

## 2022-11-09 ENCOUNTER — HOSPITAL ENCOUNTER (OUTPATIENT)
Age: 72
Discharge: HOME | End: 2022-11-10
Attending: INTERNAL MEDICINE | Admitting: INTERNAL MEDICINE
Payer: MEDICARE

## 2022-11-09 DIAGNOSIS — I25.10 CAD S/P PERCUTANEOUS CORONARY ANGIOPLASTY: ICD-10-CM

## 2022-11-09 DIAGNOSIS — Z98.61 CAD S/P PERCUTANEOUS CORONARY ANGIOPLASTY: ICD-10-CM

## 2022-11-09 DIAGNOSIS — I25.119 CORONARY ARTERY DISEASE INVOLVING NATIVE CORONARY ARTERY OF NATIVE HEART WITH ANGINA PECTORIS (HCC): ICD-10-CM

## 2022-11-09 DIAGNOSIS — I25.10 CAD IN NATIVE ARTERY: ICD-10-CM

## 2022-11-09 DIAGNOSIS — Z09 HOSPITAL DISCHARGE FOLLOW-UP: Primary | ICD-10-CM

## 2022-11-09 LAB
ACT BLD: 271 SECS (ref 70–128)
ACT BLD: 329 SECS (ref 70–128)
EKG ATRIAL RATE: 48 BPM
EKG ATRIAL RATE: 49 BPM
EKG DIAGNOSIS: NORMAL
EKG DIAGNOSIS: NORMAL
EKG P AXIS: 45 DEGREES
EKG P AXIS: 59 DEGREES
EKG P-R INTERVAL: 184 MS
EKG P-R INTERVAL: 188 MS
EKG Q-T INTERVAL: 442 MS
EKG Q-T INTERVAL: 446 MS
EKG QRS DURATION: 80 MS
EKG QRS DURATION: 86 MS
EKG QTC CALCULATION (BAZETT): 398 MS
EKG QTC CALCULATION (BAZETT): 399 MS
EKG R AXIS: 23 DEGREES
EKG R AXIS: 33 DEGREES
EKG T AXIS: 30 DEGREES
EKG T AXIS: 59 DEGREES
EKG VENTRICULAR RATE: 48 BPM
EKG VENTRICULAR RATE: 49 BPM

## 2022-11-09 PROCEDURE — C1760 CLOSURE DEV, VASC: HCPCS | Performed by: INTERNAL MEDICINE

## 2022-11-09 PROCEDURE — 2500000003 HC RX 250 WO HCPCS: Performed by: INTERNAL MEDICINE

## 2022-11-09 PROCEDURE — C1874 STENT, COATED/COV W/DEL SYS: HCPCS | Performed by: INTERNAL MEDICINE

## 2022-11-09 PROCEDURE — C1725 CATH, TRANSLUMIN NON-LASER: HCPCS | Performed by: INTERNAL MEDICINE

## 2022-11-09 PROCEDURE — 6370000000 HC RX 637 (ALT 250 FOR IP): Performed by: INTERNAL MEDICINE

## 2022-11-09 PROCEDURE — 85347 COAGULATION TIME ACTIVATED: CPT

## 2022-11-09 PROCEDURE — 99152 MOD SED SAME PHYS/QHP 5/>YRS: CPT | Performed by: INTERNAL MEDICINE

## 2022-11-09 PROCEDURE — 2709999900 HC NON-CHARGEABLE SUPPLY: Performed by: INTERNAL MEDICINE

## 2022-11-09 PROCEDURE — 92978 ENDOLUMINL IVUS OCT C 1ST: CPT | Performed by: INTERNAL MEDICINE

## 2022-11-09 PROCEDURE — 6360000004 HC RX CONTRAST MEDICATION: Performed by: INTERNAL MEDICINE

## 2022-11-09 PROCEDURE — 93799 UNLISTED CV SVC/PROCEDURE: CPT | Performed by: INTERNAL MEDICINE

## 2022-11-09 PROCEDURE — 92943 PRQ TRLUML REVSC CH OCC ANT: CPT | Performed by: INTERNAL MEDICINE

## 2022-11-09 PROCEDURE — 85347 COAGULATION TIME ACTIVATED: CPT | Performed by: INTERNAL MEDICINE

## 2022-11-09 PROCEDURE — C1753 CATH, INTRAVAS ULTRASOUND: HCPCS | Performed by: INTERNAL MEDICINE

## 2022-11-09 PROCEDURE — C1887 CATHETER, GUIDING: HCPCS | Performed by: INTERNAL MEDICINE

## 2022-11-09 PROCEDURE — 93005 ELECTROCARDIOGRAM TRACING: CPT | Performed by: INTERNAL MEDICINE

## 2022-11-09 PROCEDURE — 2580000003 HC RX 258: Performed by: INTERNAL MEDICINE

## 2022-11-09 PROCEDURE — C1894 INTRO/SHEATH, NON-LASER: HCPCS | Performed by: INTERNAL MEDICINE

## 2022-11-09 PROCEDURE — C1769 GUIDE WIRE: HCPCS | Performed by: INTERNAL MEDICINE

## 2022-11-09 PROCEDURE — C9607 PERC D-E COR REVASC CHRO SIN: HCPCS | Performed by: INTERNAL MEDICINE

## 2022-11-09 PROCEDURE — 6360000002 HC RX W HCPCS: Performed by: INTERNAL MEDICINE

## 2022-11-09 PROCEDURE — 99153 MOD SED SAME PHYS/QHP EA: CPT | Performed by: INTERNAL MEDICINE

## 2022-11-09 DEVICE — STENT RONYX25038UX RESOLUTE ONYX 2.50X38
Type: IMPLANTABLE DEVICE | Site: HEART | Status: FUNCTIONAL
Brand: RESOLUTE ONYX™

## 2022-11-09 DEVICE — STENT RONYX35038UX RESOLUTE ONYX 3.50X38
Type: IMPLANTABLE DEVICE | Site: HEART | Status: FUNCTIONAL
Brand: RESOLUTE ONYX™

## 2022-11-09 RX ORDER — MORPHINE SULFATE 4 MG/ML
2 INJECTION INTRAVENOUS
Status: COMPLETED | OUTPATIENT
Start: 2022-11-09 | End: 2022-11-09

## 2022-11-09 RX ORDER — SODIUM CHLORIDE 0.9 % (FLUSH) 0.9 %
5-40 SYRINGE (ML) INJECTION PRN
Status: DISCONTINUED | OUTPATIENT
Start: 2022-11-09 | End: 2022-11-10 | Stop reason: HOSPADM

## 2022-11-09 RX ORDER — HYDRALAZINE HYDROCHLORIDE 20 MG/ML
10 INJECTION INTRAMUSCULAR; INTRAVENOUS EVERY 10 MIN PRN
Status: DISCONTINUED | OUTPATIENT
Start: 2022-11-09 | End: 2022-11-10 | Stop reason: HOSPADM

## 2022-11-09 RX ORDER — SODIUM CHLORIDE 9 MG/ML
INJECTION, SOLUTION INTRAVENOUS CONTINUOUS
Status: DISCONTINUED | OUTPATIENT
Start: 2022-11-09 | End: 2022-11-10 | Stop reason: HOSPADM

## 2022-11-09 RX ORDER — SODIUM CHLORIDE 0.9 % (FLUSH) 0.9 %
5-40 SYRINGE (ML) INJECTION EVERY 12 HOURS SCHEDULED
Status: DISCONTINUED | OUTPATIENT
Start: 2022-11-09 | End: 2022-11-10 | Stop reason: HOSPADM

## 2022-11-09 RX ORDER — MAGNESIUM HYDROXIDE/ALUMINUM HYDROXICE/SIMETHICONE 120; 1200; 1200 MG/30ML; MG/30ML; MG/30ML
SUSPENSION ORAL PRN
Status: DISCONTINUED | OUTPATIENT
Start: 2022-11-09 | End: 2022-11-09 | Stop reason: HOSPADM

## 2022-11-09 RX ORDER — HEPARIN SODIUM 200 [USP'U]/100ML
INJECTION, SOLUTION INTRAVENOUS CONTINUOUS PRN
Status: DISCONTINUED | OUTPATIENT
Start: 2022-11-09 | End: 2022-11-09 | Stop reason: HOSPADM

## 2022-11-09 RX ORDER — ACETAMINOPHEN 325 MG/1
650 TABLET ORAL EVERY 4 HOURS PRN
Status: DISCONTINUED | OUTPATIENT
Start: 2022-11-09 | End: 2022-11-10 | Stop reason: HOSPADM

## 2022-11-09 RX ORDER — 0.9 % SODIUM CHLORIDE 0.9 %
500 INTRAVENOUS SOLUTION INTRAVENOUS PRN
Status: DISCONTINUED | OUTPATIENT
Start: 2022-11-09 | End: 2022-11-10 | Stop reason: HOSPADM

## 2022-11-09 RX ORDER — ATROPINE SULFATE 0.4 MG/ML
0.5 AMPUL (ML) INJECTION
Status: DISCONTINUED | OUTPATIENT
Start: 2022-11-09 | End: 2022-11-10 | Stop reason: HOSPADM

## 2022-11-09 RX ORDER — ASPIRIN 81 MG/1
324 TABLET, CHEWABLE ORAL DAILY
Status: DISCONTINUED | OUTPATIENT
Start: 2022-11-09 | End: 2022-11-10 | Stop reason: HOSPADM

## 2022-11-09 RX ORDER — MIDAZOLAM HYDROCHLORIDE 1 MG/ML
INJECTION INTRAMUSCULAR; INTRAVENOUS PRN
Status: DISCONTINUED | OUTPATIENT
Start: 2022-11-09 | End: 2022-11-09 | Stop reason: HOSPADM

## 2022-11-09 RX ORDER — LIDOCAINE HYDROCHLORIDE 10 MG/ML
INJECTION, SOLUTION INFILTRATION; PERINEURAL PRN
Status: DISCONTINUED | OUTPATIENT
Start: 2022-11-09 | End: 2022-11-09 | Stop reason: HOSPADM

## 2022-11-09 RX ORDER — SODIUM CHLORIDE 9 MG/ML
INJECTION, SOLUTION INTRAVENOUS PRN
Status: DISCONTINUED | OUTPATIENT
Start: 2022-11-09 | End: 2022-11-10 | Stop reason: HOSPADM

## 2022-11-09 RX ORDER — HEPARIN SODIUM 10000 [USP'U]/ML
INJECTION, SOLUTION INTRAVENOUS; SUBCUTANEOUS PRN
Status: DISCONTINUED | OUTPATIENT
Start: 2022-11-09 | End: 2022-11-09 | Stop reason: HOSPADM

## 2022-11-09 RX ORDER — TRAMADOL HYDROCHLORIDE 50 MG/1
50 TABLET ORAL EVERY 6 HOURS PRN
Status: DISCONTINUED | OUTPATIENT
Start: 2022-11-09 | End: 2022-11-10 | Stop reason: HOSPADM

## 2022-11-09 RX ORDER — CLOPIDOGREL BISULFATE 75 MG/1
TABLET ORAL PRN
Status: DISCONTINUED | OUTPATIENT
Start: 2022-11-09 | End: 2022-11-09 | Stop reason: HOSPADM

## 2022-11-09 RX ORDER — TRAMADOL HYDROCHLORIDE 50 MG/1
100 TABLET ORAL EVERY 6 HOURS PRN
Status: DISCONTINUED | OUTPATIENT
Start: 2022-11-09 | End: 2022-11-10 | Stop reason: HOSPADM

## 2022-11-09 RX ORDER — CLOPIDOGREL BISULFATE 75 MG/1
75 TABLET ORAL DAILY
Status: DISCONTINUED | OUTPATIENT
Start: 2022-11-10 | End: 2022-11-10 | Stop reason: HOSPADM

## 2022-11-09 RX ADMIN — MORPHINE SULFATE 2 MG: 4 INJECTION INTRAVENOUS at 10:02

## 2022-11-09 RX ADMIN — TRAMADOL HYDROCHLORIDE 100 MG: 50 TABLET, COATED ORAL at 20:26

## 2022-11-09 RX ADMIN — TRAMADOL HYDROCHLORIDE 50 MG: 50 TABLET, COATED ORAL at 13:28

## 2022-11-09 RX ADMIN — MIDAZOLAM 1 MG: 1 INJECTION INTRAMUSCULAR; INTRAVENOUS at 08:40

## 2022-11-09 RX ADMIN — Medication 10 ML: at 20:26

## 2022-11-09 ASSESSMENT — PAIN SCALES - GENERAL
PAINLEVEL_OUTOF10: 6
PAINLEVEL_OUTOF10: 7
PAINLEVEL_OUTOF10: 6
PAINLEVEL_OUTOF10: 0
PAINLEVEL_OUTOF10: 7

## 2022-11-09 ASSESSMENT — PAIN DESCRIPTION - LOCATION
LOCATION: ABDOMEN;BACK;GROIN
LOCATION: ARM
LOCATION: ABDOMEN;BACK;GROIN
LOCATION: BACK;NECK

## 2022-11-09 ASSESSMENT — PAIN DESCRIPTION - DESCRIPTORS
DESCRIPTORS: BURNING
DESCRIPTORS: CRAMPING
DESCRIPTORS: CRAMPING
DESCRIPTORS: ACHING;SORE

## 2022-11-09 ASSESSMENT — PAIN - FUNCTIONAL ASSESSMENT
PAIN_FUNCTIONAL_ASSESSMENT: ACTIVITIES ARE NOT PREVENTED
PAIN_FUNCTIONAL_ASSESSMENT: ACTIVITIES ARE NOT PREVENTED

## 2022-11-09 ASSESSMENT — PAIN DESCRIPTION - PAIN TYPE
TYPE: ACUTE PAIN

## 2022-11-09 ASSESSMENT — PAIN DESCRIPTION - ONSET: ONSET: GRADUAL

## 2022-11-09 ASSESSMENT — PAIN DESCRIPTION - ORIENTATION
ORIENTATION: RIGHT
ORIENTATION: LEFT

## 2022-11-09 ASSESSMENT — PAIN DESCRIPTION - FREQUENCY: FREQUENCY: INTERMITTENT

## 2022-11-09 NOTE — PROGRESS NOTES
TRANSFER - OUT REPORT:    Verbal report given to [facility]. on [facility]  being transferred to [facility] for routine progression of patient care       Report consisted of patient's Situation, Background, Assessment and   Recommendations(SBAR). Information from the following report(s) Nurse Handoff Report was reviewed with the receiving nurse. Campbell Assessment: No data recorded  Lines:   Peripheral IV 11/09/22 Right Antecubital (Active)        Opportunity for questions and clarification was provided.       Patient transported with:  Monitor and Registered Nurse

## 2022-11-09 NOTE — PROGRESS NOTES
Patient received to [address] [place] [address] room # 11. Ambulatory from [facility]. Patient scheduled for  today with Dr Nader Sood. Procedure reviewed & questions answered, voiced good understanding consent obtained & placed on chart. All medications and medical history reviewed. Will prep patient per orders. Patient & family updated on plan of care. The patient has a fraility score of 3-MANAGING WELL, based on ability to ambulate independently and to complete own ADLs. Michelle Green

## 2022-11-09 NOTE — Clinical Note
Contrast Dose Calculator:   Patient's age: 67.   Patient's sex: Female. Patient weight (kg) = 91.6. Creatinine level (mg/dL) = 0.7. Creatinine clearance (mL/min): 105. Contrast concentration (mg/mL) = 370. MACD = 300 mL. Max Contrast dose per Creatinine Cl calculator = 236.25 mL.

## 2022-11-09 NOTE — PROGRESS NOTES
Called to room by pt saying \"I feel something wet. \"  RFA site bleeding, pressure held x20 mins until hemostasis achieved. Pt in and out cathed, with return of 400ml clear yellow urine.   Site dressed with quick clot and tegaderm

## 2022-11-09 NOTE — CARE COORDINATION
Pt presented to [facility] for scheduled  today with Dr Sree Gastelum. Pt indep at baseline. Lives alone. Drives. Denies DME need. On RA. PCP confirmed. Insurance verified and able to afford home meds. No d/c needs identified currently but will remain available. 11/09/22 8288   Service Assessment   Patient Orientation Alert and Oriented   Cognition Alert   History Provided By Patient   Primary Caregiver Self   Support Systems Children;Family Members;[employer] [employer];Friends/Neighbors   PCP Verified by CM Yes  (Jackie)   Prior Functional Level Independent in ADLs/IADLs   Current Functional Level Independent in ADLs/IADLs   Ability to make needs known: Good   Family able to assist with home care needs: Yes   Would you like for me to discuss the discharge plan with any other family members/significant others, and if so, who? Yes   Financial Resources Medicare   Social/Functional History   Lives With Alone   Type of Home House   ADL Assistance Independent   Ambulation Assistance Independent   Active  Yes   Mode of Transportation Car   Occupation Retired   Discharge Planning   Current Services Prior To Admission None   Potential Assistance Needed N/A   DME Ordered? No   Potential Assistance Purchasing Medications No   Type of Home Care Services None   Patient expects to be discharged to: [facility] Discharge   Transition of Care Consult (CM Consult) Discharge [address] Discharge None   [facility] Information Provided?  No   Mode of Transport at Discharge Other (see comment)   Confirm Follow Up Transport Family

## 2022-11-09 NOTE — Clinical Note
[facility]  Surgery Department  Operative Note    SUMMARY     Date of Procedure: 6/26/2020     Procedure: Procedure(s) (LRB):  REPAIR, HERNIA, INGUINAL, WITHOUT HISTORY OF PRIOR REPAIR, AGE 5 YEARS OR OLDER. RIGHT WITH MESH (Right)     Surgeon(s) and Role:     * Parvez Colin MD - Primary     * Dominik Olmos MD - Resident - Assisting     * Jaylen Gant MD - Resident - Assisting    Pre-Operative Diagnosis: Right inguinal hernia [K40.90]    Post-Operative Diagnosis: Post-Op Diagnosis Codes:     * Right inguinal hernia [K40.90]    Anesthesia: General    Indications: Andrea Preston is a 57 y.o. male referred to [facility] with a history of a symptomatic, reducible inguinal bulge. The history and exam were consistent with inguinal hernia. We recommended an open inguinal hernia repair with mesh and the patient agreed to proceed. The patient signed informed consent and expressed understanding of the risks and benefits of surgery.     Operative Procedure: Andrea Preston was identified in preoperative holding and brought back to the operating room. The patient was placed supine on the operating table and padded appropriately. Monitors were applied and monitored anesthesia care was initiated. His right groin was shaved with electric clippers and prepped and draped in the standard sterile surgical fashion. A timeout was performed and all team members present agreed this was the correct procedure on the correct side of the correct patient. We also confirmed administration of appropriate preoperative antibiotics.    We marked out an appropriate right inguinal incision and a 5 cm oblique skin incision was made. Subcutaneous tissues was divided with Bovie electrocautery. This dissection was carried down through Vandana fascia down to the aponeurosis of the external oblique. The aponeurosis of the external oblique was incised in line with its fibers, first with a scalpel and then Metzenbaum scissors,  Sheath was inserted in the right radial artery. and this incision was extended to the external spermatic ring. The inguinal canal contents were bluntly encircled, first digitally and then with a Penrose drain. Weitlaner retractors were placed to facilitate the dissection. We performed skeletonization of the spermatic cord. We proceeded to identify a indirect inguinal hernia sac, which was carefully dissected away from the inguinal canal contents down to the level of the internal spermatic ring. The hernia sac was then opened to ensure that all contents were reduced into the abdomen. We performed suture ligation of the hernia sac using 2-0 Silk and the sac was excised and handed off for pathology. We then had appropriate borders of the inguinal canal identified and decided to repair the defect with a piece of polypropylene mesh. The mesh was cut to fit the defect appropriately and sewn in place with 2-0 PDS. The mesh was sewn to the shelving edge of the inguinal ligament inferiorly. Superiorly, the mesh was anchored to the conjoined tendon. The tails of the mesh were brought together around the cord structures creating a new internal ring that just admitted the tip of the finger. The mesh was inspected and noted to lie in appropriate position without any redundancy or tension. The testicle was pulled back down to the scrotum and there was noted to be no tension on the cord structures. The aponeurosis of the external oblique was closed with a running 3-0 Vicryl suture. Vnadana fascia was closed with several buried interrupted 3-0 Vicryl sutures and the skin was closed with a running subcuticular Monocryl suture. Dermabond was applied. The patient was then transported to the PACU in stable condition. All sponge, instrument and needle counts were correct at the end of the case.  Dr. Colin was present for the case.     Complications: No    Estimated Blood Loss (EBL): 20mL           Implants:   Implant Name Type Inv. Item Serial No.  Lot No. LRB No. Used  Action   MESH MARLEX 2X12 - [id number]  MESH MARLEX 2X12  [employer] [id number] 1 Implanted       Specimens:   Specimen (12h ago, onward)    None                  Condition: Good    Disposition: PACU - hemodynamically stable.

## 2022-11-09 NOTE — PROGRESS NOTES
TRANSFER - IN REPORT:    Verbal report received from BIA Evans on Nanda Bennett being received from [address] [place] [address] for routine progression of patient care      Report consisted of patients Situation, Background, Assessment and Recommendations(SBAR). Information from the following report(s) SBAR, Kardex, Procedure Summary, Intake/Output, and Cardiac Rhythm SB  was reviewed with the receiving nurse. Opportunity for questions and clarification was provided. Assessment completed upon patients arrival to unit and care assumed.

## 2022-11-09 NOTE — PROGRESS NOTES
TRANSFER - OUT REPORT:    [facility] with Dr. Esau Shah  Access: Right radial access   Right femoral access  2 stents to RCA    Tr band applied to right Radial with 12 ml in band  Right femoral closed with 8 fr angioseal  No bleeding or hematoma site soft    MAR  5 mg Versed  50 mcg Fentanyl   9,000 units heparin  300 mg plavix  30 ml mylanta       Verbal report given to Sammie Edwards on [facility]  being transferred to [facility] RN for routine progression of patient care       Report consisted of patient's Situation, Background, Assessment and Recommendations(SBAR). Information from the following report(s) Nurse Handoff Report and MAR was reviewed with the receiving nurse. Opportunity for questions and clarification was provided.       Patient transported with:  Registered Nurse

## 2022-11-09 NOTE — PROGRESS NOTES
Report received from Milton. 49. Procedural finding communicated. Intra procedural medication administration reviewed. Progression of care discussed. Patient received into CPRU room 6, Post     Access site without bleeding or swelling. Right wrist and right groin    Patient instructed to limit movement of right arm and right leg extremity. Routine post procedural vital signs & site assessment initiated.

## 2022-11-10 VITALS
OXYGEN SATURATION: 94 % | RESPIRATION RATE: 17 BRPM | HEART RATE: 55 BPM | SYSTOLIC BLOOD PRESSURE: 109 MMHG | BODY MASS INDEX: 31.59 KG/M2 | WEIGHT: 201.7 LBS | DIASTOLIC BLOOD PRESSURE: 65 MMHG | TEMPERATURE: 98.1 F

## 2022-11-10 PROBLEM — I25.118 CORONARY ARTERY DISEASE OF NATIVE ARTERY OF NATIVE HEART WITH STABLE ANGINA PECTORIS (HCC): Status: ACTIVE | Noted: 2019-09-25

## 2022-11-10 LAB
ANION GAP SERPL CALC-SCNC: 5 MMOL/L (ref 2–11)
BUN SERPL-MCNC: 8 MG/DL (ref 8–23)
CALCIUM SERPL-MCNC: 8.7 MG/DL (ref 8.3–10.4)
CHLORIDE SERPL-SCNC: 109 MMOL/L (ref 101–110)
CO2 SERPL-SCNC: 25 MMOL/L (ref 21–32)
CREAT SERPL-MCNC: 0.7 MG/DL (ref 0.6–1)
ERYTHROCYTE [DISTWIDTH] IN BLOOD BY AUTOMATED COUNT: 13.6 % (ref 11.9–14.6)
GLUCOSE SERPL-MCNC: 102 MG/DL (ref 65–100)
HCT VFR BLD AUTO: 35.8 % (ref 35.8–46.3)
HGB BLD-MCNC: 11.6 G/DL (ref 11.7–15.4)
MCH RBC QN AUTO: 32 PG (ref 26.1–32.9)
MCHC RBC AUTO-ENTMCNC: 32.4 G/DL (ref 31.4–35)
MCV RBC AUTO: 98.9 FL (ref 82–102)
NRBC # BLD: 0 K/UL (ref 0–0.2)
PLATELET # BLD AUTO: 183 K/UL (ref 150–450)
PMV BLD AUTO: 10.3 FL (ref 9.4–12.3)
POTASSIUM SERPL-SCNC: 4.1 MMOL/L (ref 3.5–5.1)
RBC # BLD AUTO: 3.62 M/UL (ref 4.05–5.2)
SODIUM SERPL-SCNC: 139 MMOL/L (ref 133–143)
WBC # BLD AUTO: 6.5 K/UL (ref 4.3–11.1)

## 2022-11-10 PROCEDURE — 99212 OFFICE O/P EST SF 10 MIN: CPT | Performed by: INTERNAL MEDICINE

## 2022-11-10 PROCEDURE — 2580000003 HC RX 258: Performed by: INTERNAL MEDICINE

## 2022-11-10 PROCEDURE — 6370000000 HC RX 637 (ALT 250 FOR IP): Performed by: INTERNAL MEDICINE

## 2022-11-10 PROCEDURE — 80048 BASIC METABOLIC PNL TOTAL CA: CPT

## 2022-11-10 PROCEDURE — 6370000000 HC RX 637 (ALT 250 FOR IP): Performed by: NURSE PRACTITIONER

## 2022-11-10 PROCEDURE — 85027 COMPLETE CBC AUTOMATED: CPT

## 2022-11-10 PROCEDURE — 36415 COLL VENOUS BLD VENIPUNCTURE: CPT

## 2022-11-10 RX ORDER — CLOPIDOGREL BISULFATE 75 MG/1
75 TABLET ORAL DAILY
Qty: 90 TABLET | Refills: 3 | Status: SHIPPED | OUTPATIENT
Start: 2022-11-10

## 2022-11-10 RX ORDER — MAGNESIUM HYDROXIDE/ALUMINUM HYDROXICE/SIMETHICONE 120; 1200; 1200 MG/30ML; MG/30ML; MG/30ML
30 SUSPENSION ORAL PRN
Status: COMPLETED | OUTPATIENT
Start: 2022-11-10 | End: 2022-11-10

## 2022-11-10 RX ORDER — ATORVASTATIN CALCIUM 80 MG/1
80 TABLET, FILM COATED ORAL NIGHTLY
Qty: 30 TABLET | Refills: 3 | Status: SHIPPED | OUTPATIENT
Start: 2022-11-10

## 2022-11-10 RX ORDER — ATORVASTATIN CALCIUM 80 MG/1
80 TABLET, FILM COATED ORAL NIGHTLY
Status: DISCONTINUED | OUTPATIENT
Start: 2022-11-10 | End: 2022-11-10 | Stop reason: HOSPADM

## 2022-11-10 RX ADMIN — ASPIRIN 324 MG: 81 TABLET, CHEWABLE ORAL at 09:31

## 2022-11-10 RX ADMIN — Medication 10 ML: at 09:47

## 2022-11-10 RX ADMIN — TRAMADOL HYDROCHLORIDE 50 MG: 50 TABLET, COATED ORAL at 03:37

## 2022-11-10 RX ADMIN — CLOPIDOGREL BISULFATE 75 MG: 75 TABLET ORAL at 09:31

## 2022-11-10 RX ADMIN — TRAMADOL HYDROCHLORIDE 50 MG: 50 TABLET, COATED ORAL at 09:31

## 2022-11-10 RX ADMIN — ALUMINUM HYDROXIDE, MAGNESIUM HYDROXIDE, AND SIMETHICONE 30 ML: 200; 200; 20 SUSPENSION ORAL at 09:32

## 2022-11-10 ASSESSMENT — PAIN DESCRIPTION - LOCATION
LOCATION: GROIN;INCISION
LOCATION: GROIN;INCISION

## 2022-11-10 ASSESSMENT — PAIN DESCRIPTION - FREQUENCY
FREQUENCY: INTERMITTENT
FREQUENCY: INTERMITTENT

## 2022-11-10 ASSESSMENT — PAIN SCALES - GENERAL
PAINLEVEL_OUTOF10: 6
PAINLEVEL_OUTOF10: 4
PAINLEVEL_OUTOF10: 0
PAINLEVEL_OUTOF10: 0

## 2022-11-10 ASSESSMENT — PAIN DESCRIPTION - ONSET
ONSET: PROGRESSIVE
ONSET: GRADUAL

## 2022-11-10 ASSESSMENT — PAIN DESCRIPTION - ORIENTATION
ORIENTATION: RIGHT
ORIENTATION: RIGHT

## 2022-11-10 ASSESSMENT — PAIN DESCRIPTION - PAIN TYPE
TYPE: ACUTE PAIN
TYPE: ACUTE PAIN

## 2022-11-10 ASSESSMENT — PAIN DESCRIPTION - DESCRIPTORS: DESCRIPTORS: ACHING;SORE

## 2022-11-10 NOTE — PLAN OF CARE
Problem: Discharge Planning  Goal: Discharge to home or other facility with appropriate resources  Outcome: Progressing     Problem: Pain  Goal: Verbalizes/displays adequate comfort level or baseline comfort level  Outcome: Progressing     Problem: Safety - Adult  Goal: Free from fall injury  Outcome: Progressing     Problem: ABCDS Injury Assessment  Goal: Absence of physical injury  Outcome: Progressing     Problem: Cardiovascular - Adult  Goal: Maintains optimal cardiac output and hemodynamic stability  Outcome: Progressing  Goal: Absence of cardiac dysrhythmias or at baseline  Outcome: Progressing

## 2022-11-10 NOTE — DISCHARGE INSTRUCTIONS
The patient is being discharged home in stable condition on a low saturated fat, low cholesterol and low salt diet. The patient is instructed to advance activities as tolerated to the limit of fatigue or shortness of breath. The patient is instructed to avoid all heavy lifting for 5 days. The patient is instructed to watch the cath site for bleeding/oozing; if seen, the patient is instructed to apply firm pressure with a clean cloth and call [facility] at [phone]. The patient is instructed to watch for signs of infection which include: increasing area of redness, fever/hot to touch or purulent drainage at the catheterization site. The patient is instructed not to soak in a bathtub for 7-10 days, but is cleared to shower. The patient is instructed to call the office or return to the ER for immediate evaluation for any shortness of breath or chest pain not relieved by NTG. No lifting of 10 lbs or more for 7 days, no tub baths for a week, may shower, no creams, lotions powders, or ointments to site for a week. Learning About Coronary Angioplasty  What is a coronary angioplasty? Coronary angioplasty is a procedure that uses a thin tube called a catheter to open a blocked or narrowed coronary artery. Coronary arteries are the blood vessels that bring oxygen to the heart muscle. Angioplasty also may be called percutaneous coronary intervention (PCI). Angioplasty can widen an artery that has been narrowed by fatty buildup (plaque) or blocked by a blood clot. The procedure helps blood flow more normally to the heart muscle. How is the procedure done? Coronary angioplasty is done in a cardiac catheterization laboratory (\"cath lab\"). It is done by a heart specialist called a cardiologist. The whole procedure may take 1½ to 3 hours. You lie on a table under a large X-ray machine. You will get medicine through an IV in one of your veins. It helps you relax and not feel pain.  You will be awake during the procedure. But you may not be able to remember much about it. The doctor will inject some medicine into your wrist or groin to numb the skin. You will feel a small needle poke you. It's like having a blood test. You may feel some pressure when the doctor puts in the catheter. But you will not feel pain. The doctor will look at X-ray pictures on a monitor (like a TV screen) to move the catheter to your heart. The doctor then puts a dye into the catheter. This makes your heart's arteries show up on a screen. The doctor can then see any arteries that are blocked or narrowed. You may feel warm or flushed for a short time when the doctor injects dye into your artery. If you have a blocked or narrow artery, the doctor uses a catheter with a tiny balloon at the tip. The doctor puts it into the blocked or narrow area and inflates it. The balloon presses the fatty buildup against the walls of the artery. This buildup is called plaque. This creates more room for blood to flow. In most cases, the doctor then puts a stent in the artery. A stent is a small, expandable tube. It presses against the walls of the artery. The stent is left in the artery to keep the artery open. This helps blood flow. The catheter is removed from your body. What happens right after the procedure? The catheter will be removed. Pressure may be applied to the area where the catheter was put into your blood vessel. This will help prevent bleeding. A small device may also be used to close the blood vessel. You may have a bandage or a compression device on the catheter site. After the procedure, you will be taken to a room where the catheter site and your heart rate, blood pressure, and temperature will be checked several times. If the catheter was put in your groin, you will need to lie still and keep your leg straight for up to a few hours.  If the catheter was put in your wrist, you may need to keep your arm still for at least 2 hours. You may go home the same day. Or you may stay at least 1 night in the hospital. When you go home, you will get instructions from your doctor to help you recover well and prevent problems. Make sure to drink plenty of fluids (unless your doctor tells you not to) for several hours after the procedure. This will help flush the dye out of your body. Follow-up care is a key part of your treatment and safety. Be sure to make and go to all appointments, and call your doctor if you are having problems. It's also a good idea to know your test results and keep a list of the medicines you take. Where can you learn more? Go to https://D[employer] Energy.Trustlook. org and sign in to your K[employer] account. Enter A455 in the [facility] box to learn more about \"Learning About Coronary Angioplasty. \"     If you do not have an account, please click on the \"Sign Up Now\" link. Current as of: January 10, 2022               Content Version: 13.4  © 2006-2022 [employer]. Care instructions adapted under license by [facility] ([facility]). If you have questions about a medical condition or this instruction, always ask your healthcare professional. [place] [address] any warranty or liability for your use of this information. atorvastatin  Pronunciation:  a TOR va sta tin  Brand:  Lipitor  What is the most important information I should know about atorvastatin? You should not take atorvastatin if you are pregnant or breastfeeding, or if you have liver disease. Tell your doctor about all your current medicines and any you start or stop using. Many drugs can interact, and some drugs should not be used together. Atorvastatin can cause the breakdown of muscle tissue, which can lead to kidney failure. Call your doctor right away if you have unexplained muscle pain, tenderness, or weakness especially if you also have fever, unusual tiredness, or dark urine.   What is atorvastatin? Atorvastatin is used together with diet to lower blood levels of \"bad\" cholesterol (low-density lipoprotein, or LDL), to increase levels of \"good\" cholesterol (high-density lipoprotein, or HDL), and to lower triglycerides (a type of fat in the blood). Atorvastatin is used to treat high cholesterol, and to lower the risk of stroke, heart attack, or other heart complications in people with type 2 diabetes, coronary heart disease, or other risk factors. Atorvastatin is used in adults and children who are at least 8years old. Atorvastatin may also be used for purposes not listed in this medication guide. What should I discuss with my healthcare provider before taking atorvastatin? You should not use atorvastatin if you are allergic to it, or if you have liver disease. Do not use if you are pregnant. This medicine can harm an unborn baby. Use effective birth control to prevent pregnancy. Stop taking this medicine and tell your doctor at once if you become pregnant. Do not  breastfeed while you are taking atorvastatin. Tell your doctor if you have ever had:  liver problems;  muscle pain or weakness;  kidney disease;  diabetes;  a thyroid disorder; or  if you drink more than 2 alcoholic beverages daily. Atorvastatin can cause the breakdown of muscle tissue, which can lead to kidney failure. This happens more often in women, in older adults, or people who have kidney disease or poorly controlled hypothyroidism (underactive thyroid). Atorvastatin is not approved for use by anyone younger than 8years old. How should I take atorvastatin? Follow all directions on your prescription label and read all medication guides or instruction sheets. Your doctor may occasionally change your dose. Use the medicine exactly as directed. Take the medicine at the same time each day, with or without food. Do not break an atorvastatin tablet before taking it, unless your doctor has told you to.   You may need to stop using atorvastatin for a short time if you have:  uncontrolled seizures;  an electrolyte imbalance (such as high or low potassium levels in your blood);  severely low blood pressure;  a severe infection or illness; or  surgery or a medical emergency. It may take up to 2 weeks before your cholesterol levels improve, and you may need frequent blood tests. Even if you have no symptoms, tests can help your doctor determine if this medicine is effective. Atorvastatin is only part of a complete treatment program that may also include diet, exercise, and weight control. Follow your doctor's instructions very closely. Store at room temperature away from moisture, heat, and light. What happens if I miss a dose? Use the medicine as soon as you can, but skip the missed dose if you are more than 12 hours late for the dose. Do not use two doses at one time. What happens if I overdose? Seek emergency medical attention or call the Poison Help line at 1-735.147.7077. What should I avoid while taking atorvastatin? Avoid eating foods high in fat or cholesterol, or atorvastatin will not be as effective. Avoid drinking alcohol. It can raise triglyceride levels and may increase your risk of liver damage. Grapefruit may interact with atorvastatin and lead to unwanted side effects. Avoid drinking more than 1 liter of grapefruit juice while taking atorvastatin. What are the possible side effects of atorvastatin? Get emergency medical help if you have signs of an allergic reaction: hives; difficulty breathing; swelling of your face, lips, tongue, or throat. In rare cases, atorvastatin can cause a condition that results in the breakdown of skeletal muscle tissue, leading to kidney failure. Call your doctor right away if you have unexplained muscle pain, tenderness, or weakness especially if you also have fever, unusual tiredness, and dark colored urine.   Also call your doctor at once if you have:  muscle weakness in your hips, shoulders, neck, and back;  trouble lifting your arms, trouble climbing or standing;  liver problems --upper stomach pain, weakness, tired feeling, loss of appetite, dark urine, jaundice (yellowing of the skin or eyes); or  kidney problems --little or no urinating, swelling in your feet or ankles, feeling tired or short of breath. Common side effects may include:  joint pain;  stuffy nose, sore throat;  diarrhea; or  pain in your arms or legs. This is not a complete list of side effects and others may occur. Call your doctor for medical advice about side effects. You may report side effects to FDA at 8-871-NVA-5360. What other drugs will affect atorvastatin? Certain other drugs can increase your risk of serious muscle problems, and it is very important that your doctor knows if you are using any of them. Tell your doctor about all your current medicines and any you start or stop using, especially:  other cholesterol-lowering medication;  antibiotic or antifungal medicine;  birth control pills;  medicine to prevent organ transplant rejection;  heart medication; or  medicine to treat hepatitis C or HIV. This list is not complete and many other drugs may affect atorvastatin. This includes prescription and over-the-counter medicines, vitamins, and herbal products. Not all possible drug interactions are listed here. Where can I get more information? Your pharmacist can provide more information about atorvastatin. Remember, keep this and all other medicines out of the reach of children, never share your medicines with others, and use this medication only for the indication prescribed. Every effort has been made to ensure that the information provided by [address] is accurate, up-to-date, and complete, but no guarantee is made to that effect. Drug information contained herein may be time sensitive.  [employer] information has been compiled for use by healthcare practitioners and consumers in the [place] and therefore [employer] does not warrant that uses outside of the [place] are appropriate, unless specifically indicated otherwise. [facility]'s drug information does not endorse drugs, diagnose patients or recommend therapy. [facility] drug information is an informational resource designed to assist licensed healthcare practitioners in caring for their patients and/or to serve consumers viewing this service as a supplement to, and not a substitute for, the expertise, skill, knowledge and judgment of healthcare practitioners. The absence of a warning for a given drug or drug combination in no way should be construed to indicate that the drug or drug combination is safe, effective or appropriate for any given patient. [facility] does not assume any responsibility for any aspect of healthcare administered with the aid of information [facility] provides. The information contained herein is not intended to cover all possible uses, directions, precautions, warnings, drug interactions, allergic reactions, or adverse effects. If you have questions about the drugs you are taking, check with your doctor, nurse or pharmacist.  Copyright 5551-6486 [address]: 22.02. Revision date: 2/9/2021. Care instructions adapted under license by [facility] ([facility]). If you have questions about a medical condition or this instruction, always ask your healthcare professional. [place] [address] any warranty or liability for your use of this information. Learning About Coronary Artery Disease (CAD)  What is coronary artery disease? Coronary artery disease is a condition that occurs when plaque builds up in the arteries that bring oxygen-rich blood to your heart. Plaque is a fatty substance made of cholesterol, calcium, and other substances in the blood. This process is called hardening of the arteries, or atherosclerosis. What happens when you have coronary artery disease?   Plaque may narrow the know your test results and keep a list of the medicines you take. Where can you learn more? Go to https://chpepiceweb.KBLE. org and sign in to your [employer] account. Enter [phone] in the [facility] box to learn more about \"Learning About Coronary Artery Disease (CAD). \"     If you do not have an account, please click on the \"Sign Up Now\" link. Current as of: January 10, 2022               Content Version: 13.4  © 4007-4173 Healthwise, Incorporated. Care instructions adapted under license by [facility] ([facility]). If you have questions about a medical condition or this instruction, always ask your healthcare professional. [address] any warranty or liability for your use of this information.

## 2022-11-10 NOTE — CARE COORDINATION
CM met with patient prior to discharge for discharge needs. Patient reports a family member will transport her home.

## 2022-11-10 NOTE — PROGRESS NOTES
Cardiac Rehab: Spoke with patient regarding referral to cardiac rehab. Patient meets admission criteria based on PCI (11/9/22). Written information about Cardiac Rehab given and reviewed with patient. Discussed lifestyle modifications to promote cardiac wellness. Patient indicated that she can not  participate in the cardiac rehab program due to conflicting work schedule. Her Cardiologist is Dr. Nik Linda.       Thank you,  KIM SnowdenN, RN  Cardiopulmonary Rehabilitation Nurse Liaison  [facility]

## 2022-11-10 NOTE — PLAN OF CARE
Problem: Discharge Planning  Goal: Discharge to home or other facility with appropriate resources  11/10/2022 1016 by Joby Díaz. Cathersuzanne Serum RN  Outcome: Completed  11/10/2022 0214 by Casimiro Escalera RN  Outcome: Progressing     Problem: Pain  Goal: Verbalizes/displays adequate comfort level or baseline comfort level  11/10/2022 1016 by Joby Díaz. Juancarlos Will RN  Outcome: Completed  11/10/2022 0214 by Casimiro Escalera RN  Outcome: Progressing     Problem: Safety - Adult  Goal: Free from fall injury  11/10/2022 1016 by Joby Díaz. Saud Serum RN  Outcome: Completed  11/10/2022 0214 by Casimiro Escalera RN  Outcome: Progressing     Problem: ABCDS Injury Assessment  Goal: Absence of physical injury  11/10/2022 1016 by Joby Díaz. Juancarlos Will RN  Outcome: Completed  11/10/2022 0214 by Casimiro Escalera RN  Outcome: Progressing     Problem: Cardiovascular - Adult  Goal: Maintains optimal cardiac output and hemodynamic stability  11/10/2022 1016 by Joby Díaz. Juancarlos Will RN  Outcome: Completed  11/10/2022 0214 by Casimiro Escalera RN  Outcome: Progressing  Goal: Absence of cardiac dysrhythmias or at baseline  11/10/2022 1016 by Joby Díaz.  Juancarlos Will RN  Outcome: Completed  11/10/2022 0214 by Casimiro Escalera RN  Outcome: Progressing

## 2022-11-10 NOTE — DISCHARGE SUMMARY
[facility] Cardiology Discharge Summary     Patient ID:  Symone Collins  [id number]  59 y.o.  1950    Admit date: 2022    Discharge date:  11/10/22    Admitting Physician: Arlette Josue MD     Discharge Physician: ALONSO Medina CNP/Dr. Bard Vences    Admission Diagnoses: Coronary artery disease involving native coronary artery of native heart with angina pectoris ([address] [I25.119]  CAD S/P percutaneous coronary angioplasty [I25.10, Z98.61]  CAD in native artery [I25.10]    Discharge Diagnoses: Principal Problem:    Coronary artery disease of native artery of native heart with stable angina pectoris [facility])  Active Problems:    CAD S/P percutaneous coronary angioplasty    CAD in native artery  Resolved Problems:    * No resolved hospital problems. *       Cardiology Procedures this admission:  Left heart catheterization with PCI  Consults: none    Hospital Course: Patient was seen at the office of [facility] Cardiology Dr Nader Sood for complaints of ongoing fatigue and dyspnea with known  and was subsequently scheduled for a LHC at [facility] on 2022. Patient underwent cardiac catheterization by Dr. Nader Sood and found to have the followin/09/22    CARDIAC PROCEDURE 2022  9:58 AM (Final)    Addendum 2022 11:55 AM  Successful  intervention to the proximal right coronary artery with FABIO placement x2 and IVUS. 7 German femoral access site hemostasis achieved with Angio-Seal  6 German radial access site hemostasis achieved with a Terumo band  300 mg Plavix reload    Muriel Linn MD  Addendum by: Arlette Josue MD on 2022 11:55 AM    Signed by: Arlette Josue MD on 2022  9:58 AM      Patient tolerated the procedure well and was taken to the telemetry floor for recovery. The following morning patient was up feeling well without any complaints of chest pain or shortness of breath. Patient's right radial cath site was clean, dry and intact without hematoma or bruit. Patient's labs were WNL. Patient was seen and examined by  [facility] [place] and determined stable and ready for discharge. Patient was instructed on the importance of medication compliance. For the OMT of CAD the patient patient will be on the following regiment:  ASA, Plavix, and high intensity statin with zetia. The pt will not be on a BB due to bradycardia. The patient will follow up with [facility] Cardiology Dr. Lyn Mcdaniel and has been referred to cardiac rehab. DISPOSITION: The patient is being discharged home in stable condition on a low saturated fat, low cholesterol and low salt diet. The patient is instructed to advance activities as tolerated to the limit of fatigue or shortness of breath. The patient is instructed to avoid all heavy lifting for 5 days. The patient is instructed to watch the cath site for bleeding/oozing; if seen, the patient is instructed to apply firm pressure with a clean cloth and call [facility] Cardiology at [phone]. The patient is instructed to watch for signs of infection which include: increasing area of redness, fever/hot to touch or purulent drainage at the catheterization site. The patient is instructed not to soak in a bathtub for 7-10 days, but is cleared to shower. The patient is instructed to call the office or return to the ER for immediate evaluation for any shortness of breath or chest pain not relieved by NTG. Discharge Exam: /65   Pulse 55   Temp 98.1 °F (36.7 °C) (Oral)   Resp 17   Wt 201 lb 11.2 oz (91.5 kg)   SpO2 94%   BMI 31.59 kg/m²   Patient has been seen by  [facility] [place] see his progress note for exam details.     Recent Results (from the past 24 hour(s))   POCT activated clotting time    Collection Time: 11/09/22  9:06 AM   Result Value Ref Range    Activated Clotting Time 271 (H) 70 - 128 SECS   POCT activated clotting time    Collection Time: 11/09/22  9:33 AM   Result Value Ref Range    Activated Clotting Time 329 (H) 70 - 128 SECS   EKG 12 lead Collection Time: 11/09/22  1:29 PM   Result Value Ref Range    Ventricular Rate 48 BPM    Atrial Rate 48 BPM    P-R Interval 188 ms    QRS Duration 80 ms    Q-T Interval 446 ms    QTc Calculation (Bazett) 398 ms    P Axis 59 degrees    R Axis 23 degrees    T Axis 59 degrees    Diagnosis Sinus bradycardia    CBC    Collection Time: 11/10/22  3:27 AM   Result Value Ref Range    WBC 6.5 4.3 - 11.1 K/uL    RBC 3.62 (L) 4.05 - 5.2 M/uL    Hemoglobin 11.6 (L) 11.7 - 15.4 g/dL    Hematocrit 35.8 35.8 - 46.3 %    MCV 98.9 82 - 102 FL    MCH 32.0 26.1 - 32.9 PG    MCHC 32.4 31.4 - 35.0 g/dL    RDW 13.6 11.9 - 14.6 %    Platelets 271 287 - 384 K/uL    MPV 10.3 9.4 - 12.3 FL    nRBC 0.00 0.0 - 0.2 K/uL   Basic Metabolic Panel    Collection Time: 11/10/22  3:27 AM   Result Value Ref Range    Sodium 139 133 - 143 mmol/L    Potassium 4.1 3.5 - 5.1 mmol/L    Chloride 109 101 - 110 mmol/L    CO2 25 21 - 32 mmol/L    Anion Gap 5 2 - 11 mmol/L    Glucose 102 (H) 65 - 100 mg/dL    BUN 8 8 - 23 MG/DL    Creatinine 0.70 0.6 - 1.0 MG/DL    Est, Glom Filt Rate >60 >60 ml/min/1.73m2    Calcium 8.7 8.3 - 10.4 MG/DL         Patient Instructions:     Current Discharge Medication List        CONTINUE these medications which have CHANGED    Details   atorvastatin (LIPITOR) 80 MG tablet Take 1 tablet by mouth nightly  Qty: 30 tablet, Refills: 3      clopidogrel (PLAVIX) 75 MG tablet Take 1 tablet by mouth daily  Qty: 90 tablet, Refills: 3           CONTINUE these medications which have NOT CHANGED    Details   cyclobenzaprine (FLEXERIL) 10 MG tablet Take 10 mg by mouth as needed      meloxicam (MOBIC) 15 MG tablet Take 15 mg by mouth daily as needed      ezetimibe (ZETIA) 10 MG tablet Take 1 tablet by mouth daily  Qty: 90 tablet, Refills: 3      ascorbic acid (VITAMIN C) 500 MG tablet Take by mouth daily      aspirin 81 MG EC tablet Take 81 mg by mouth daily      vitamin D 25 MCG (1000 UT) CAPS Take by mouth daily      cyanocobalamin 1000 MCG tablet Take 1,000 mcg by mouth daily      nitroGLYCERIN (NITROSTAT) 0.4 MG SL tablet Place 0.4 mg under the tongue              Signed:  ALONSO Teixeira - TYRESE-C  11/10/2022  8:29 AM      Patient seen and examined by me. Agree with above note by physician extender.   Key findings are:  No CP or HOOD  CV- RRR without murmur  Lungs- Clear bilaterally  Ext- no edema    Plan: CAD- stable-home with import of DAPT stressed

## 2022-11-29 ENCOUNTER — OFFICE VISIT (OUTPATIENT)
Dept: CARDIOLOGY CLINIC | Age: 72
End: 2022-11-29
Payer: MEDICARE

## 2022-11-29 VITALS
SYSTOLIC BLOOD PRESSURE: 122 MMHG | HEIGHT: 67 IN | HEART RATE: 64 BPM | BODY MASS INDEX: 31.71 KG/M2 | DIASTOLIC BLOOD PRESSURE: 70 MMHG | WEIGHT: 202 LBS

## 2022-11-29 DIAGNOSIS — E78.5 DYSLIPIDEMIA: ICD-10-CM

## 2022-11-29 DIAGNOSIS — Z98.61 CAD S/P PERCUTANEOUS CORONARY ANGIOPLASTY: Primary | ICD-10-CM

## 2022-11-29 DIAGNOSIS — I25.10 CAD S/P PERCUTANEOUS CORONARY ANGIOPLASTY: Primary | ICD-10-CM

## 2022-11-29 PROCEDURE — 1090F PRES/ABSN URINE INCON ASSESS: CPT | Performed by: INTERNAL MEDICINE

## 2022-11-29 PROCEDURE — G8484 FLU IMMUNIZE NO ADMIN: HCPCS | Performed by: INTERNAL MEDICINE

## 2022-11-29 PROCEDURE — 3017F COLORECTAL CA SCREEN DOC REV: CPT | Performed by: INTERNAL MEDICINE

## 2022-11-29 PROCEDURE — 1123F ACP DISCUSS/DSCN MKR DOCD: CPT | Performed by: INTERNAL MEDICINE

## 2022-11-29 PROCEDURE — 99213 OFFICE O/P EST LOW 20 MIN: CPT | Performed by: INTERNAL MEDICINE

## 2022-11-29 PROCEDURE — G8427 DOCREV CUR MEDS BY ELIG CLIN: HCPCS | Performed by: INTERNAL MEDICINE

## 2022-11-29 PROCEDURE — G8400 PT W/DXA NO RESULTS DOC: HCPCS | Performed by: INTERNAL MEDICINE

## 2022-11-29 PROCEDURE — 4004F PT TOBACCO SCREEN RCVD TLK: CPT | Performed by: INTERNAL MEDICINE

## 2022-11-29 PROCEDURE — G8417 CALC BMI ABV UP PARAM F/U: HCPCS | Performed by: INTERNAL MEDICINE

## 2022-11-29 ASSESSMENT — ENCOUNTER SYMPTOMS
PHOTOPHOBIA: 0
ALLERGIC/IMMUNOLOGIC NEGATIVE: 1
SHORTNESS OF BREATH: 0
CHEST TIGHTNESS: 0
BACK PAIN: 0
EYES NEGATIVE: 1
EYE PAIN: 0
GASTROINTESTINAL NEGATIVE: 1
RESPIRATORY NEGATIVE: 1
ABDOMINAL PAIN: 0

## 2022-11-29 NOTE — PROGRESS NOTES
[facility] CARDIOLOGY  [address], [address], [address]  [facility], [address] [place] [address]  PHONE: [phone]      22    NAME:  Lonny Rodriguez  : 1950  MRN: [id number]         SUBJECTIVE:   Lonny Rodriguez is a 67 y.o. 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 Ernesto Bravo)      HPI:  Follow-up after successful  of the proximal right coronary artery listed above. Remains on dual antiplatelet therapy. Feels since the procedure she has more energy and her breathing has improved. She is complaining today of some dizziness. This has been an ongoing problem for a while. Blood pressure and heart rate are normal.  Her radial cath site is well-healed. Past Medical History, Past Surgical History, Family history, Social History, and Medications were all reviewed with the patient today and updated as necessary.        Current Outpatient Medications:     atorvastatin (LIPITOR) 80 MG tablet, Take 1 tablet by mouth nightly, Disp: 30 tablet, Rfl: 3    clopidogrel (PLAVIX) 75 MG tablet, Take 1 tablet by mouth daily, Disp: 90 tablet, Rfl: 3    cyclobenzaprine (FLEXERIL) 10 MG tablet, Take 10 mg by mouth as needed, Disp: , Rfl:     meloxicam (MOBIC) 15 MG tablet, Take 15 mg by mouth daily as needed, Disp: , Rfl:     ezetimibe (ZETIA) 10 MG tablet, Take 1 tablet by mouth daily, Disp: 90 tablet, Rfl: 3    aspirin 81 MG EC tablet, Take 81 mg by mouth daily, Disp: , Rfl:     vitamin D 25 MCG (1000 UT) CAPS, Take by mouth daily, Disp: , Rfl:     cyanocobalamin 1000 MCG tablet, Take 1,000 mcg by mouth daily, Disp: , Rfl:     nitroGLYCERIN (NITROSTAT) 0.4 MG SL tablet, Place 0.4 mg under the tongue, Disp: , Rfl:     ascorbic acid (VITAMIN C) 500 MG tablet, Take by mouth daily (Patient not taking: Reported on 11/29/2022), Disp: , Rfl:   Allergies   Allergen Reactions    Adhesive Tape      Past Medical History:   Diagnosis Date    Arthritis     CAD (coronary artery disease) 9/25/2019    Endometriosis 1973    Fibrocystic breast changes     Sleep apnea      Past Surgical History:   Procedure Laterality Date    CARDIAC PROCEDURE N/A 10/4/2022    Left heart cath / coronary angiography performed by Monique Mcneal MD at [address] [place] [address] N/A 10/4/2022    Percutaneous coronary intervention performed by Monique Mcneal MD at [address] [place] [address] N/A 11/9/2022    Chronic total occlusion coronary intervention performed by Jose Manuel Weiss MD at [address] [place] [address] N/A 11/9/2022    CORONARY ANGIOGRAPHY performed by Jose Manuel Weiss MD at [address] [place] [address] CATH LAB    CARDIAC PROCEDURE N/A 11/9/2022    Intravascular ultrasound performed by Jose Manuel Weiss MD at [address] [place] [address] CATH LAB    OVARY REMOVAL Right     TONSILLECTOMY       Family History   Problem Relation Age of Onset    Ovarian Cancer Mother     Stomach Cancer Mother     Cancer Father         liver    Heart Failure Maternal Grandmother      Social History     Tobacco Use    Smoking status: Some Days     Types: Cigarettes    Smokeless tobacco: Never    Tobacco comments:     STATES SHE QUIT ABOUT A MONTH AGO- USING PATCHES   Substance Use Topics    Alcohol use: Yes       ROS:  Review of Systems   Constitutional: Negative. Negative for fever. HENT:  Negative for hearing loss, nosebleeds and tinnitus. Eyes: Negative. Negative for photophobia and pain. Respiratory: Negative. Negative for chest tightness and shortness of breath. Cardiovascular: Negative. Negative for chest pain, palpitations and leg swelling. Gastrointestinal: Negative.   Negative for abdominal pain. Endocrine: Negative. Negative for cold intolerance and heat intolerance. Genitourinary: Negative. Negative for dysuria. Musculoskeletal: Negative. Negative for back pain and joint swelling. Skin: Negative. Negative for rash. Allergic/Immunologic: Negative. Negative for immunocompromised state. Neurological: Negative. Negative for dizziness, syncope and light-headedness. Hematological: Negative. Does not bruise/bleed easily. Psychiatric/Behavioral: Negative. Negative for suicidal ideas. PHYSICAL EXAM:  Physical Exam  Constitutional:       General: She is not in acute distress. Appearance: She is not ill-appearing. HENT:      Head: Normocephalic and atraumatic. Nose: No congestion. Mouth/Throat:      Mouth: Mucous membranes are moist.   Eyes:      Extraocular Movements: Extraocular movements intact. Pupils: Pupils are equal, round, and reactive to light. Cardiovascular:      Rate and Rhythm: Normal rate and regular rhythm. Heart sounds: No murmur heard. No friction rub. No gallop. Pulmonary:      Effort: No respiratory distress. Breath sounds: No wheezing or rhonchi. Musculoskeletal:         General: No swelling. Cervical back: Normal range of motion. Right lower leg: No edema. Left lower leg: No edema. Skin:     General: Skin is warm and dry. Findings: No rash. Neurological:      General: No focal deficit present. Mental Status: She is oriented to person, place, and time.    Psychiatric:         Mood and Affect: Mood normal.         Behavior: Behavior normal.         Judgment: Judgment normal.        /70   Pulse 64   Ht 5' 7\" (1.702 m)   Wt 202 lb (91.6 kg)   BMI 31.64 kg/m²      Wt Readings from Last 10 Encounters:   11/29/22 202 lb (91.6 kg)   11/10/22 201 lb 11.2 oz (91.5 kg)   10/31/22 202 lb (91.6 kg)   10/04/22 203 lb (92.1 kg)   10/03/22 203 lb (92.1 kg)   09/02/22 195 lb (88.5 kg) Medical problems and test results were reviewed with the patient today. Lab Results   Component Value Date/Time    BUN 8 11/10/2022 03:27 AM     No results found for: ASHLEY, CREAPOC, CREA  Lab Results   Component Value Date/Time    K 4.1 11/10/2022 03:27 AM       No results found for: CHOL, CHOLPOCT, CHOLX, CHLST, CHOLV, HDL, HDLPOC, HDLC, LDL, LDLC, VLDLC, VLDL, TGLX, TRIGL    ASSESSMENT and PLAN    ICD-10-CM    1. CAD S/P percutaneous coronary angioplasty  I25.10     Z98.61       2. Dyslipidemia  E78.5           IMPRESSION:  1) CAD -you dual antiplatelet therapy for a minimum of 12 months. 2) Lipids -continue high-dose atorvastatin and Zetia 10 mg daily  3) BP is normal        ALL ORDERS THIS ENCOUNTER  No orders of the defined types were placed in this encounter. Follow up in 3 months. With primary cardiologist Dr Antonieta Castaneda     Thank you for allowing me to participate in this patient's care. Please call or contact me if there are any questions or concerns regarding the above.       Muriel Linn MD  11/29/22  11:28 AM

## 2023-04-06 ENCOUNTER — OFFICE VISIT (OUTPATIENT)
Dept: CARDIOLOGY CLINIC | Age: 73
End: 2023-04-06
Payer: MEDICARE

## 2023-04-06 VITALS
HEIGHT: 67 IN | SYSTOLIC BLOOD PRESSURE: 138 MMHG | WEIGHT: 211 LBS | BODY MASS INDEX: 33.12 KG/M2 | HEART RATE: 60 BPM | DIASTOLIC BLOOD PRESSURE: 68 MMHG

## 2023-04-06 DIAGNOSIS — I10 PRIMARY HYPERTENSION: Chronic | ICD-10-CM

## 2023-04-06 DIAGNOSIS — E78.5 DYSLIPIDEMIA: Primary | ICD-10-CM

## 2023-04-06 DIAGNOSIS — Z72.0 TOBACCO ABUSE: ICD-10-CM

## 2023-04-06 DIAGNOSIS — I25.118 CORONARY ARTERY DISEASE OF NATIVE ARTERY OF NATIVE HEART WITH STABLE ANGINA PECTORIS (HCC): ICD-10-CM

## 2023-04-06 PROBLEM — Z98.61 CAD S/P PERCUTANEOUS CORONARY ANGIOPLASTY: Status: RESOLVED | Noted: 2022-11-09 | Resolved: 2023-04-06

## 2023-04-06 PROBLEM — I25.10 CAD IN NATIVE ARTERY: Status: RESOLVED | Noted: 2022-11-09 | Resolved: 2023-04-06

## 2023-04-06 PROBLEM — I25.10 CAD S/P PERCUTANEOUS CORONARY ANGIOPLASTY: Status: RESOLVED | Noted: 2022-11-09 | Resolved: 2023-04-06

## 2023-04-06 PROBLEM — I20.0 ACCELERATING ANGINA (HCC): Status: RESOLVED | Noted: 2022-10-03 | Resolved: 2023-04-06

## 2023-04-06 PROCEDURE — 3075F SYST BP GE 130 - 139MM HG: CPT | Performed by: INTERNAL MEDICINE

## 2023-04-06 PROCEDURE — 3017F COLORECTAL CA SCREEN DOC REV: CPT | Performed by: INTERNAL MEDICINE

## 2023-04-06 PROCEDURE — G8428 CUR MEDS NOT DOCUMENT: HCPCS | Performed by: INTERNAL MEDICINE

## 2023-04-06 PROCEDURE — 1123F ACP DISCUSS/DSCN MKR DOCD: CPT | Performed by: INTERNAL MEDICINE

## 2023-04-06 PROCEDURE — 4004F PT TOBACCO SCREEN RCVD TLK: CPT | Performed by: INTERNAL MEDICINE

## 2023-04-06 PROCEDURE — 99214 OFFICE O/P EST MOD 30 MIN: CPT | Performed by: INTERNAL MEDICINE

## 2023-04-06 PROCEDURE — 3078F DIAST BP <80 MM HG: CPT | Performed by: INTERNAL MEDICINE

## 2023-04-06 PROCEDURE — 1090F PRES/ABSN URINE INCON ASSESS: CPT | Performed by: INTERNAL MEDICINE

## 2023-04-06 PROCEDURE — G8417 CALC BMI ABV UP PARAM F/U: HCPCS | Performed by: INTERNAL MEDICINE

## 2023-04-06 PROCEDURE — G8400 PT W/DXA NO RESULTS DOC: HCPCS | Performed by: INTERNAL MEDICINE

## 2023-04-06 ASSESSMENT — ENCOUNTER SYMPTOMS
BACK PAIN: 0
COUGH: 0
SHORTNESS OF BREATH: 0
ABDOMINAL PAIN: 0

## 2023-04-06 NOTE — PROGRESS NOTES
for now. Patient may benefit from therapies to more optimally control blood pressure. Problem List Items Addressed This Visit          Circulatory    Primary hypertension (Chronic)    Coronary artery disease of native artery of native heart with stable angina pectoris ([address]       Other    Tobacco abuse    Dyslipidemia - Primary     There are no discontinued medications. Patient has been instructed and agrees to call our office with any issues or other concerns related to their cardiac condition(s) and/or complaint(s). Return in about 6 months (around 10/6/2023).        Charleen Ocasio MD  4/6/2023

## 2023-10-13 ENCOUNTER — OFFICE VISIT (OUTPATIENT)
Age: 73
End: 2023-10-13
Payer: MEDICARE

## 2023-10-13 VITALS
DIASTOLIC BLOOD PRESSURE: 68 MMHG | WEIGHT: 203 LBS | SYSTOLIC BLOOD PRESSURE: 114 MMHG | HEART RATE: 56 BPM | BODY MASS INDEX: 31.86 KG/M2 | HEIGHT: 67 IN

## 2023-10-13 DIAGNOSIS — I25.118 CORONARY ARTERY DISEASE OF NATIVE ARTERY OF NATIVE HEART WITH STABLE ANGINA PECTORIS (HCC): Primary | ICD-10-CM

## 2023-10-13 DIAGNOSIS — E78.5 DYSLIPIDEMIA: ICD-10-CM

## 2023-10-13 DIAGNOSIS — Z72.0 TOBACCO ABUSE: ICD-10-CM

## 2023-10-13 DIAGNOSIS — I10 PRIMARY HYPERTENSION: Chronic | ICD-10-CM

## 2023-10-13 PROCEDURE — G8484 FLU IMMUNIZE NO ADMIN: HCPCS | Performed by: INTERNAL MEDICINE

## 2023-10-13 PROCEDURE — 99214 OFFICE O/P EST MOD 30 MIN: CPT | Performed by: INTERNAL MEDICINE

## 2023-10-13 PROCEDURE — G8427 DOCREV CUR MEDS BY ELIG CLIN: HCPCS | Performed by: INTERNAL MEDICINE

## 2023-10-13 PROCEDURE — 93000 ELECTROCARDIOGRAM COMPLETE: CPT | Performed by: INTERNAL MEDICINE

## 2023-10-13 PROCEDURE — G8400 PT W/DXA NO RESULTS DOC: HCPCS | Performed by: INTERNAL MEDICINE

## 2023-10-13 PROCEDURE — 1090F PRES/ABSN URINE INCON ASSESS: CPT | Performed by: INTERNAL MEDICINE

## 2023-10-13 PROCEDURE — 1036F TOBACCO NON-USER: CPT | Performed by: INTERNAL MEDICINE

## 2023-10-13 PROCEDURE — 3017F COLORECTAL CA SCREEN DOC REV: CPT | Performed by: INTERNAL MEDICINE

## 2023-10-13 PROCEDURE — 3074F SYST BP LT 130 MM HG: CPT | Performed by: INTERNAL MEDICINE

## 2023-10-13 PROCEDURE — G8417 CALC BMI ABV UP PARAM F/U: HCPCS | Performed by: INTERNAL MEDICINE

## 2023-10-13 PROCEDURE — 1123F ACP DISCUSS/DSCN MKR DOCD: CPT | Performed by: INTERNAL MEDICINE

## 2023-10-13 PROCEDURE — 3078F DIAST BP <80 MM HG: CPT | Performed by: INTERNAL MEDICINE

## 2023-10-13 ASSESSMENT — ENCOUNTER SYMPTOMS
BACK PAIN: 0
ABDOMINAL PAIN: 0
SHORTNESS OF BREATH: 0
COUGH: 0

## 2023-10-13 NOTE — PROGRESS NOTES
[facility]  [address] [address]      10/13/23      NAME:  Leim Saint  : 1950  MRN: [id number]      SUBJECTIVE:   Leim Saint is a 68 y.o. female seen for a follow up visit regarding the following:     Chief Complaint   Patient presents with    6 Month Follow-Up    Coronary Artery Disease       HPI:   68 y.o. male with history of coronary disease and prior PCI and stenting. She has uncontrolled lipids and hypertension. Patient unfortunately continues to smoke. 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 Patient is now s/p  reconstruction of RCA by DR Destinee Ibrahim 2022. She has chronic stable angina. Past Medical History, Past Surgical History, Family history, Social History, and Medications were all reviewed with the patient today and updated as necessary. Current Outpatient Medications   Medication Sig Dispense Refill    atorvastatin (LIPITOR) 80 MG tablet Take 1 tablet by mouth nightly 30 tablet 3    clopidogrel (PLAVIX) 75 MG tablet Take 1 tablet by mouth daily 90 tablet 3    meloxicam (MOBIC) 15 MG tablet Take 1 tablet by mouth daily as needed      ezetimibe (ZETIA) 10 MG tablet Take 1 tablet by mouth daily 90 tablet 3    ascorbic acid (VITAMIN C) 500 MG tablet Take by mouth daily      aspirin 81 MG EC tablet Take 1 tablet by mouth daily      vitamin D 25 MCG (1000 UT) CAPS Take by mouth daily      cyanocobalamin 1000 MCG tablet Take 1 tablet by mouth daily      nitroGLYCERIN (NITROSTAT) 0.4 MG SL tablet Place 1 tablet under the tongue      cyclobenzaprine (FLEXERIL) 10 MG tablet Take 10 mg by mouth as needed (Patient not taking: Reported on 2023)       No current facility-administered medications for this visit.      Patient Active Problem List    Diagnosis Date Noted    Primary hypertension 2023     Priority: High    Tobacco abuse 10/16/2019     Priority: Low

## 2023-11-09 RX ORDER — EZETIMIBE 10 MG/1
10 TABLET ORAL DAILY
Qty: 90 TABLET | Refills: 3 | Status: SHIPPED | OUTPATIENT
Start: 2023-11-09

## 2023-11-09 NOTE — TELEPHONE ENCOUNTER
MEDICATION REFILL REQUEST      Name of Medication:  Ezetimibe  Dose:  10 mg  Frequency:  QD  Quantity:  90  Days' supply:  90 with refills      Pharmacy Name/Location:  [phone]

## 2024-04-19 ENCOUNTER — OFFICE VISIT (OUTPATIENT)
Age: 74
End: 2024-04-19
Payer: MEDICARE

## 2024-04-19 VITALS
DIASTOLIC BLOOD PRESSURE: 72 MMHG | WEIGHT: 205 LBS | HEART RATE: 52 BPM | HEIGHT: 67 IN | SYSTOLIC BLOOD PRESSURE: 124 MMHG | BODY MASS INDEX: 32.18 KG/M2

## 2024-04-19 DIAGNOSIS — E78.5 DYSLIPIDEMIA: ICD-10-CM

## 2024-04-19 DIAGNOSIS — Z72.0 TOBACCO ABUSE: ICD-10-CM

## 2024-04-19 DIAGNOSIS — I10 PRIMARY HYPERTENSION: Chronic | ICD-10-CM

## 2024-04-19 DIAGNOSIS — I25.118 CORONARY ARTERY DISEASE OF NATIVE ARTERY OF NATIVE HEART WITH STABLE ANGINA PECTORIS (HCC): Primary | ICD-10-CM

## 2024-04-19 PROCEDURE — 3078F DIAST BP <80 MM HG: CPT | Performed by: INTERNAL MEDICINE

## 2024-04-19 PROCEDURE — 3017F COLORECTAL CA SCREEN DOC REV: CPT | Performed by: INTERNAL MEDICINE

## 2024-04-19 PROCEDURE — 1090F PRES/ABSN URINE INCON ASSESS: CPT | Performed by: INTERNAL MEDICINE

## 2024-04-19 PROCEDURE — G8417 CALC BMI ABV UP PARAM F/U: HCPCS | Performed by: INTERNAL MEDICINE

## 2024-04-19 PROCEDURE — 1036F TOBACCO NON-USER: CPT | Performed by: INTERNAL MEDICINE

## 2024-04-19 PROCEDURE — 3074F SYST BP LT 130 MM HG: CPT | Performed by: INTERNAL MEDICINE

## 2024-04-19 PROCEDURE — 1123F ACP DISCUSS/DSCN MKR DOCD: CPT | Performed by: INTERNAL MEDICINE

## 2024-04-19 PROCEDURE — 99214 OFFICE O/P EST MOD 30 MIN: CPT | Performed by: INTERNAL MEDICINE

## 2024-04-19 PROCEDURE — G8428 CUR MEDS NOT DOCUMENT: HCPCS | Performed by: INTERNAL MEDICINE

## 2024-04-19 PROCEDURE — G8400 PT W/DXA NO RESULTS DOC: HCPCS | Performed by: INTERNAL MEDICINE

## 2024-04-19 RX ORDER — NITROGLYCERIN 0.4 MG/1
0.4 TABLET SUBLINGUAL EVERY 5 MIN PRN
Qty: 25 TABLET | Refills: 1 | Status: SHIPPED | OUTPATIENT
Start: 2024-04-19

## 2024-04-19 ASSESSMENT — ENCOUNTER SYMPTOMS
SHORTNESS OF BREATH: 0
ABDOMINAL PAIN: 0
COUGH: 0
BACK PAIN: 0

## 2024-04-19 NOTE — PROGRESS NOTES
[facility] CARDIOLOGY  [address] [place] [address], [address]  [place] [address]      24      NAME:  Brie Bar  : 1950  MRN: [id number]      SUBJECTIVE:   Brie Bar is a 74 y.o. female seen for a follow up visit regarding the following:     Chief Complaint   Patient presents with    Hyperlipidemia    Coronary Artery Disease       HPI:   74 y.o. female with history of coronary disease and prior PCI and stenting.   She has uncontrolled lipids and hypertension.  Patient unfortunately continues to smoke.  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  Patient is now s/p  reconstruction of RCA by Dr Monteiro 2022.  She has chronic stable angina.         Past Medical History, Past Surgical History, Family history, Social History, and Medications were all reviewed with the patient today and updated as necessary.     Current Outpatient Medications   Medication Sig Dispense Refill    nitroGLYCERIN (NITROSTAT) 0.4 MG SL tablet Place 1 tablet under the tongue every 5 minutes as needed for Chest pain 25 tablet 1    ezetimibe (ZETIA) 10 MG tablet Take 1 tablet by mouth daily 90 tablet 3    atorvastatin (LIPITOR) 80 MG tablet Take 1 tablet by mouth nightly 30 tablet 3    clopidogrel (PLAVIX) 75 MG tablet Take 1 tablet by mouth daily 90 tablet 3    cyclobenzaprine (FLEXERIL) 10 MG tablet Take 1 tablet by mouth as needed      meloxicam (MOBIC) 15 MG tablet Take 1 tablet by mouth daily as needed      ascorbic acid (VITAMIN C) 500 MG tablet Take by mouth daily      aspirin 81 MG EC tablet Take 1 tablet by mouth daily      vitamin D 25 MCG (1000 UT) CAPS Take by mouth daily      cyanocobalamin 1000 MCG tablet Take 1 tablet by mouth daily       No current facility-administered medications for this visit.     Patient Active Problem List    Diagnosis Date Noted    Primary hypertension 2023     Priority: High    Tobacco abuse 10/16/2019

## 2024-10-31 ENCOUNTER — OFFICE VISIT (OUTPATIENT)
Age: 74
End: 2024-10-31

## 2024-10-31 VITALS
HEART RATE: 56 BPM | HEIGHT: 67 IN | WEIGHT: 210.6 LBS | SYSTOLIC BLOOD PRESSURE: 122 MMHG | DIASTOLIC BLOOD PRESSURE: 72 MMHG | BODY MASS INDEX: 33.06 KG/M2

## 2024-10-31 DIAGNOSIS — E78.5 DYSLIPIDEMIA: ICD-10-CM

## 2024-10-31 DIAGNOSIS — Z72.0 TOBACCO ABUSE: ICD-10-CM

## 2024-10-31 DIAGNOSIS — I25.118 CORONARY ARTERY DISEASE OF NATIVE ARTERY OF NATIVE HEART WITH STABLE ANGINA PECTORIS (HCC): ICD-10-CM

## 2024-10-31 DIAGNOSIS — I10 PRIMARY HYPERTENSION: Primary | ICD-10-CM

## 2024-10-31 RX ORDER — EZETIMIBE 10 MG/1
10 TABLET ORAL DAILY
Qty: 90 TABLET | Refills: 3 | Status: SHIPPED | OUTPATIENT
Start: 2024-10-31

## 2024-10-31 ASSESSMENT — ENCOUNTER SYMPTOMS
BACK PAIN: 0
COUGH: 0
ABDOMINAL PAIN: 0
SHORTNESS OF BREATH: 0

## 2024-10-31 NOTE — PROGRESS NOTES
[facility] CARDIOLOGY  [address] [place] [address], [address]  [place] [address]      10/31/24      NAME:  Brie Bar  : 1950  MRN: [id number]      SUBJECTIVE:   Brie Bar is a 74 y.o. female seen for a follow up visit regarding the following:     Chief Complaint   Patient presents with    Coronary Artery Disease    Hyperlipidemia       HPI:   74 y.o. female with history of coronary disease and prior PCI and stenting.   She has uncontrolled lipids and hypertension.  Patient unfortunately continues to smoke.  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  Patient is s/p  reconstruction of RCA by Dr Monteiro 2022.  She continues with chronic stable angina.         Past Medical History, Past Surgical History, Family history, Social History, and Medications were all reviewed with the patient today and updated as necessary.     Current Outpatient Medications   Medication Sig Dispense Refill    ezetimibe (ZETIA) 10 MG tablet Take 1 tablet by mouth daily 90 tablet 3    nitroGLYCERIN (NITROSTAT) 0.4 MG SL tablet Place 1 tablet under the tongue every 5 minutes as needed for Chest pain 25 tablet 1    atorvastatin (LIPITOR) 80 MG tablet Take 1 tablet by mouth nightly 30 tablet 3    clopidogrel (PLAVIX) 75 MG tablet Take 1 tablet by mouth daily 90 tablet 3    cyclobenzaprine (FLEXERIL) 10 MG tablet Take 1 tablet by mouth as needed      meloxicam (MOBIC) 15 MG tablet Take 1 tablet by mouth daily as needed      ascorbic acid (VITAMIN C) 500 MG tablet Take by mouth daily      aspirin 81 MG EC tablet Take 1 tablet by mouth daily      vitamin D 25 MCG (1000 UT) CAPS Take by mouth daily      cyanocobalamin 1000 MCG tablet Take 1 tablet by mouth daily       No current facility-administered medications for this visit.     Patient Active Problem List    Diagnosis Date Noted    Primary hypertension 2023     Priority: High    Tobacco abuse 10/16/2019

## 2025-04-11 ENCOUNTER — OFFICE VISIT (OUTPATIENT)
Age: 75
End: 2025-04-11
Payer: MEDICARE

## 2025-04-11 VITALS
BODY MASS INDEX: 32.96 KG/M2 | SYSTOLIC BLOOD PRESSURE: 122 MMHG | WEIGHT: 210 LBS | HEIGHT: 67 IN | DIASTOLIC BLOOD PRESSURE: 62 MMHG | HEART RATE: 64 BPM

## 2025-04-11 DIAGNOSIS — Z72.0 TOBACCO ABUSE: ICD-10-CM

## 2025-04-11 DIAGNOSIS — I25.118 CORONARY ARTERY DISEASE OF NATIVE ARTERY OF NATIVE HEART WITH STABLE ANGINA PECTORIS: Primary | ICD-10-CM

## 2025-04-11 DIAGNOSIS — E78.5 DYSLIPIDEMIA: ICD-10-CM

## 2025-04-11 DIAGNOSIS — I10 PRIMARY HYPERTENSION: Chronic | ICD-10-CM

## 2025-04-11 PROCEDURE — G8417 CALC BMI ABV UP PARAM F/U: HCPCS | Performed by: INTERNAL MEDICINE

## 2025-04-11 PROCEDURE — 1036F TOBACCO NON-USER: CPT | Performed by: INTERNAL MEDICINE

## 2025-04-11 PROCEDURE — G8428 CUR MEDS NOT DOCUMENT: HCPCS | Performed by: INTERNAL MEDICINE

## 2025-04-11 PROCEDURE — 1126F AMNT PAIN NOTED NONE PRSNT: CPT | Performed by: INTERNAL MEDICINE

## 2025-04-11 PROCEDURE — 99214 OFFICE O/P EST MOD 30 MIN: CPT | Performed by: INTERNAL MEDICINE

## 2025-04-11 PROCEDURE — 3017F COLORECTAL CA SCREEN DOC REV: CPT | Performed by: INTERNAL MEDICINE

## 2025-04-11 PROCEDURE — 3074F SYST BP LT 130 MM HG: CPT | Performed by: INTERNAL MEDICINE

## 2025-04-11 PROCEDURE — 3078F DIAST BP <80 MM HG: CPT | Performed by: INTERNAL MEDICINE

## 2025-04-11 PROCEDURE — 1123F ACP DISCUSS/DSCN MKR DOCD: CPT | Performed by: INTERNAL MEDICINE

## 2025-04-11 PROCEDURE — 1090F PRES/ABSN URINE INCON ASSESS: CPT | Performed by: INTERNAL MEDICINE

## 2025-04-11 PROCEDURE — G8400 PT W/DXA NO RESULTS DOC: HCPCS | Performed by: INTERNAL MEDICINE

## 2025-04-11 ASSESSMENT — ENCOUNTER SYMPTOMS
SHORTNESS OF BREATH: 0
COUGH: 0
ABDOMINAL PAIN: 0
BACK PAIN: 0

## 2025-04-11 NOTE — PROGRESS NOTES
as she is on no therapy.  Patient may benefit from therapies to more optimally control blood pressure.    Tobacco abuse -cessation education. She has quit smoking.          Problem List Items Addressed This Visit          Circulatory    Primary hypertension (Chronic)    Coronary artery disease of native artery of native heart with stable angina pectoris - Primary       Other    Tobacco abuse    Dyslipidemia       There are no discontinued medications.                    Patient has been instructed and agrees to call our office with any issues or other concerns related to their cardiac condition(s) and/or complaint(s).      Return in about 6 months (around 10/11/2025).       CARLA MEI MD  4/11/2025

## 2025-05-16 ENCOUNTER — TELEPHONE (OUTPATIENT)
Age: 75
End: 2025-05-16

## 2025-05-16 NOTE — TELEPHONE ENCOUNTER
Cardiac Clearance        Physician or Practice Requesting:[facility]   : Brenda tejada   Contact Phone Number:   Fax Number: [phone]  Date of Surgery/Procedure: 07/08/25  Type of Surgery or Procedure: Colonoscopy   Type of Anesthesia:   Type of Clearance Requested: risk assessment and any medication hold   Medication to Hold:plavix   Days to Hold: 5 day hold

## 2025-05-16 NOTE — TELEPHONE ENCOUNTER
Primary hypertension  04/06/2023        Priority: High    Tobacco abuse  10/16/2019        Priority: Low    Coronary artery disease of native artery of native heart with stable angina pectoris  09/25/2019        Priority: Low        09/2019:  3.0x22 [place] mLAD, 4.0x22 [place] dRCA  11/2022:  PCI of  RCA 3.5x38, 2.5x38 [place].  Stable LAD stenting and Anomalous LCx.          Dyslipidemia  09/25/2019        Priority: Low

## (undated) DEVICE — DEVICE COMPR REG 24 CM VASC BND

## (undated) DEVICE — VALVE HEMSTAS W/ GWIRE INSRTN TOOL GRDIAN II NC

## (undated) DEVICE — GLIDESHEATH SLENDER STAINLESS STEEL KIT: Brand: GLIDESHEATH SLENDER

## (undated) DEVICE — CATHETER DIAG 5FR L100CM LUMN ID0.047IN JL3.5 CRV 0 SIDE H

## (undated) DEVICE — Device: Brand: FIELDER XT

## (undated) DEVICE — CATHETER DIAG AD 5FR L100CM COR GRY HYDRPHLC NYL MPA 2 W/ 2

## (undated) DEVICE — Device

## (undated) DEVICE — HI-TORQUE PILOT 50 GUIDE WIRE .014 J TIP 3.0 CM X 190 CM: Brand: HI-TORQUE PILOT

## (undated) DEVICE — CATH BLLN ANGIO 4X20MM NC EUPHORIA RX

## (undated) DEVICE — CATH BLLN ANGIO 4.50X12MM NC EUPHORIA RX

## (undated) DEVICE — VALVE HEMSTAT 8FR INNR LUMN CRSS SLT SEAL DSGN WATCHDOG

## (undated) DEVICE — DEVICE EXCHG 2FR BLLN L10MM 0.014IN MRK BND 1MM 20ATM LO

## (undated) DEVICE — CATHETER GUID JR5 0.070 INX6 FRX100 CM VISTA BRT TIP

## (undated) DEVICE — GUIDEWIRE 035IN 210CM PTFE COAT FIX COR J TIP 15MM FIRM BODY

## (undated) DEVICE — CATHETER DIAG AD 5FR L110CM 145DEG COR GRY HYDRPHLC NYL

## (undated) DEVICE — CATHETER GUID 7FR L100CM L COR S STL PTFE AL0.75 HYBRID

## (undated) DEVICE — CATH BLLN ANGIO 2X12MM SC EUPHORA RX

## (undated) DEVICE — CORONARY IMAGING CATHETER: Brand: OPTICROSS™ HD

## (undated) DEVICE — RADIFOCUS OPTITORQUE ANGIOGRAPHIC CATHETER: Brand: OPTITORQUE

## (undated) DEVICE — GUIDE NDL ST W/ 14 X 147CM TELESCOPICALLY FLD CIV FLX CVR

## (undated) DEVICE — CATH BLLN ANGIO 3.50X20MM NC EUPHORIA RX

## (undated) DEVICE — GUIDEWIRE VASC L260CM DIA0.035IN RAD 3MM J TIP L7CM PTFE

## (undated) DEVICE — CATH BLLN ANGIO 2X20MM NC EUPHORIA RX

## (undated) DEVICE — MICROCATHETER: Brand: MAMBA™ FLEX

## (undated) DEVICE — ANGIO-SEAL VIP VASCULAR CLOSURE DEVICE: Brand: ANGIO-SEAL

## (undated) DEVICE — PINNACLE TIF INTRODUCER SHEATH: Brand: PINNACLE